# Patient Record
Sex: MALE | Race: WHITE | NOT HISPANIC OR LATINO
[De-identification: names, ages, dates, MRNs, and addresses within clinical notes are randomized per-mention and may not be internally consistent; named-entity substitution may affect disease eponyms.]

---

## 2017-09-07 ENCOUNTER — APPOINTMENT (OUTPATIENT)
Dept: PULMONOLOGY | Facility: CLINIC | Age: 72
End: 2017-09-07
Payer: MEDICARE

## 2017-09-07 VITALS
TEMPERATURE: 98.2 F | DIASTOLIC BLOOD PRESSURE: 76 MMHG | WEIGHT: 160 LBS | RESPIRATION RATE: 16 BRPM | BODY MASS INDEX: 24.25 KG/M2 | HEIGHT: 68 IN | HEART RATE: 85 BPM | SYSTOLIC BLOOD PRESSURE: 156 MMHG | OXYGEN SATURATION: 96 %

## 2017-09-07 PROBLEM — Z00.00 ENCOUNTER FOR PREVENTIVE HEALTH EXAMINATION: Status: ACTIVE | Noted: 2017-09-07

## 2017-09-07 PROCEDURE — 94060 EVALUATION OF WHEEZING: CPT

## 2017-09-07 PROCEDURE — 99203 OFFICE O/P NEW LOW 30 MIN: CPT | Mod: 25

## 2017-09-07 PROCEDURE — 94729 DIFFUSING CAPACITY: CPT

## 2017-09-07 PROCEDURE — 94727 GAS DIL/WSHOT DETER LNG VOL: CPT

## 2018-02-04 ENCOUNTER — INPATIENT (INPATIENT)
Facility: HOSPITAL | Age: 73
LOS: 1 days | Discharge: HOME | DRG: 192 | End: 2018-02-06
Attending: INTERNAL MEDICINE | Admitting: INTERNAL MEDICINE
Payer: MEDICARE

## 2018-02-04 VITALS
DIASTOLIC BLOOD PRESSURE: 79 MMHG | HEART RATE: 86 BPM | HEIGHT: 71 IN | WEIGHT: 162.92 LBS | OXYGEN SATURATION: 88 % | TEMPERATURE: 99 F | SYSTOLIC BLOOD PRESSURE: 153 MMHG | RESPIRATION RATE: 20 BRPM

## 2018-02-04 DIAGNOSIS — Z90.89 ACQUIRED ABSENCE OF OTHER ORGANS: Chronic | ICD-10-CM

## 2018-02-04 DIAGNOSIS — Z29.9 ENCOUNTER FOR PROPHYLACTIC MEASURES, UNSPECIFIED: ICD-10-CM

## 2018-02-04 DIAGNOSIS — J44.1 CHRONIC OBSTRUCTIVE PULMONARY DISEASE WITH (ACUTE) EXACERBATION: ICD-10-CM

## 2018-02-04 DIAGNOSIS — R09.02 HYPOXEMIA: ICD-10-CM

## 2018-02-04 LAB
ALBUMIN SERPL ELPH-MCNC: 4.1 G/DL — SIGNIFICANT CHANGE UP (ref 3.5–5)
ALP SERPL-CCNC: 115 U/L — SIGNIFICANT CHANGE UP (ref 40–120)
ALT FLD-CCNC: 22 U/L DA — SIGNIFICANT CHANGE UP (ref 10–60)
ANION GAP SERPL CALC-SCNC: 6 MMOL/L — SIGNIFICANT CHANGE UP (ref 5–17)
APTT BLD: 30.1 SEC — SIGNIFICANT CHANGE UP (ref 27.5–37.4)
AST SERPL-CCNC: 21 U/L — SIGNIFICANT CHANGE UP (ref 10–40)
BASE EXCESS BLDA CALC-SCNC: 2.7 MMOL/L — HIGH (ref -2–2)
BASOPHILS # BLD AUTO: 0.2 K/UL — SIGNIFICANT CHANGE UP (ref 0–0.2)
BASOPHILS NFR BLD AUTO: 1.5 % — SIGNIFICANT CHANGE UP (ref 0–2)
BILIRUB SERPL-MCNC: 0.6 MG/DL — SIGNIFICANT CHANGE UP (ref 0.2–1.2)
BLOOD GAS COMMENTS ARTERIAL: SIGNIFICANT CHANGE UP
BUN SERPL-MCNC: 12 MG/DL — SIGNIFICANT CHANGE UP (ref 7–18)
CALCIUM SERPL-MCNC: 8.8 MG/DL — SIGNIFICANT CHANGE UP (ref 8.4–10.5)
CHLORIDE SERPL-SCNC: 101 MMOL/L — SIGNIFICANT CHANGE UP (ref 96–108)
CK MB BLD-MCNC: 3.5 % — SIGNIFICANT CHANGE UP (ref 0–3.5)
CK MB CFR SERPL CALC: 6.3 NG/ML — HIGH (ref 0–3.6)
CK SERPL-CCNC: 178 U/L — SIGNIFICANT CHANGE UP (ref 35–232)
CO2 SERPL-SCNC: 30 MMOL/L — SIGNIFICANT CHANGE UP (ref 22–31)
CREAT SERPL-MCNC: 0.75 MG/DL — SIGNIFICANT CHANGE UP (ref 0.5–1.3)
EOSINOPHIL # BLD AUTO: 0.6 K/UL — HIGH (ref 0–0.5)
EOSINOPHIL NFR BLD AUTO: 3.9 % — SIGNIFICANT CHANGE UP (ref 0–6)
GLUCOSE SERPL-MCNC: 96 MG/DL — SIGNIFICANT CHANGE UP (ref 70–99)
HCO3 BLDA-SCNC: 28 MMOL/L — HIGH (ref 23–27)
HCT VFR BLD CALC: 48.9 % — SIGNIFICANT CHANGE UP (ref 39–50)
HGB BLD-MCNC: 15.5 G/DL — SIGNIFICANT CHANGE UP (ref 13–17)
HOROWITZ INDEX BLDA+IHG-RTO: 21 — SIGNIFICANT CHANGE UP
INR BLD: 1.02 RATIO — SIGNIFICANT CHANGE UP (ref 0.88–1.16)
LACTATE SERPL-SCNC: 1.3 MMOL/L — SIGNIFICANT CHANGE UP (ref 0.7–2)
LIDOCAIN IGE QN: 105 U/L — SIGNIFICANT CHANGE UP (ref 73–393)
LYMPHOCYTES # BLD AUTO: 1.3 K/UL — SIGNIFICANT CHANGE UP (ref 1–3.3)
LYMPHOCYTES # BLD AUTO: 8.8 % — LOW (ref 13–44)
MCHC RBC-ENTMCNC: 31.6 GM/DL — LOW (ref 32–36)
MCHC RBC-ENTMCNC: 32.3 PG — SIGNIFICANT CHANGE UP (ref 27–34)
MCV RBC AUTO: 102.2 FL — HIGH (ref 80–100)
MONOCYTES # BLD AUTO: 1.3 K/UL — HIGH (ref 0–0.9)
MONOCYTES NFR BLD AUTO: 9.1 % — SIGNIFICANT CHANGE UP (ref 2–14)
NEUTROPHILS # BLD AUTO: 11.1 K/UL — HIGH (ref 1.8–7.4)
NEUTROPHILS NFR BLD AUTO: 76.6 % — SIGNIFICANT CHANGE UP (ref 43–77)
NT-PROBNP SERPL-SCNC: 305 PG/ML — HIGH (ref 0–125)
PCO2 BLDA: 49 MMHG — HIGH (ref 32–46)
PH BLDA: 7.38 — SIGNIFICANT CHANGE UP (ref 7.35–7.45)
PLATELET # BLD AUTO: 299 K/UL — SIGNIFICANT CHANGE UP (ref 150–400)
PO2 BLDA: 59 MMHG — LOW (ref 74–108)
POTASSIUM SERPL-MCNC: 4.2 MMOL/L — SIGNIFICANT CHANGE UP (ref 3.5–5.3)
POTASSIUM SERPL-SCNC: 4.2 MMOL/L — SIGNIFICANT CHANGE UP (ref 3.5–5.3)
PROT SERPL-MCNC: 8.3 G/DL — SIGNIFICANT CHANGE UP (ref 6–8.3)
PROTHROM AB SERPL-ACNC: 11.1 SEC — SIGNIFICANT CHANGE UP (ref 9.8–12.7)
RBC # BLD: 4.79 M/UL — SIGNIFICANT CHANGE UP (ref 4.2–5.8)
RBC # FLD: 13 % — SIGNIFICANT CHANGE UP (ref 10.3–14.5)
SAO2 % BLDA: 92 % — SIGNIFICANT CHANGE UP (ref 92–96)
SODIUM SERPL-SCNC: 137 MMOL/L — SIGNIFICANT CHANGE UP (ref 135–145)
TROPONIN I SERPL-MCNC: <0.015 NG/ML — SIGNIFICANT CHANGE UP (ref 0–0.04)
WBC # BLD: 14.5 K/UL — HIGH (ref 3.8–10.5)
WBC # FLD AUTO: 14.5 K/UL — HIGH (ref 3.8–10.5)

## 2018-02-04 PROCEDURE — 99291 CRITICAL CARE FIRST HOUR: CPT

## 2018-02-04 RX ORDER — PANTOPRAZOLE SODIUM 20 MG/1
40 TABLET, DELAYED RELEASE ORAL
Qty: 0 | Refills: 0 | Status: DISCONTINUED | OUTPATIENT
Start: 2018-02-04 | End: 2018-02-06

## 2018-02-04 RX ORDER — AZITHROMYCIN 500 MG/1
500 TABLET, FILM COATED ORAL ONCE
Qty: 0 | Refills: 0 | Status: COMPLETED | OUTPATIENT
Start: 2018-02-04 | End: 2018-02-04

## 2018-02-04 RX ORDER — CEFTRIAXONE 500 MG/1
1 INJECTION, POWDER, FOR SOLUTION INTRAMUSCULAR; INTRAVENOUS ONCE
Qty: 0 | Refills: 0 | Status: COMPLETED | OUTPATIENT
Start: 2018-02-04 | End: 2018-02-04

## 2018-02-04 RX ORDER — IPRATROPIUM/ALBUTEROL SULFATE 18-103MCG
3 AEROSOL WITH ADAPTER (GRAM) INHALATION
Qty: 0 | Refills: 0 | Status: COMPLETED | OUTPATIENT
Start: 2018-02-04 | End: 2018-02-04

## 2018-02-04 RX ORDER — SODIUM CHLORIDE 9 MG/ML
3 INJECTION INTRAMUSCULAR; INTRAVENOUS; SUBCUTANEOUS ONCE
Qty: 0 | Refills: 0 | Status: COMPLETED | OUTPATIENT
Start: 2018-02-04 | End: 2018-02-04

## 2018-02-04 RX ORDER — ENOXAPARIN SODIUM 100 MG/ML
40 INJECTION SUBCUTANEOUS DAILY
Qty: 0 | Refills: 0 | Status: DISCONTINUED | OUTPATIENT
Start: 2018-02-04 | End: 2018-02-06

## 2018-02-04 RX ORDER — IPRATROPIUM/ALBUTEROL SULFATE 18-103MCG
3 AEROSOL WITH ADAPTER (GRAM) INHALATION EVERY 6 HOURS
Qty: 0 | Refills: 0 | Status: COMPLETED | OUTPATIENT
Start: 2018-02-04 | End: 2018-02-05

## 2018-02-04 RX ORDER — ASPIRIN/CALCIUM CARB/MAGNESIUM 324 MG
81 TABLET ORAL ONCE
Qty: 0 | Refills: 0 | Status: COMPLETED | OUTPATIENT
Start: 2018-02-04 | End: 2018-02-04

## 2018-02-04 RX ORDER — ALBUTEROL 90 UG/1
2 AEROSOL, METERED ORAL EVERY 4 HOURS
Qty: 0 | Refills: 0 | Status: DISCONTINUED | OUTPATIENT
Start: 2018-02-04 | End: 2018-02-04

## 2018-02-04 RX ORDER — IPRATROPIUM/ALBUTEROL SULFATE 18-103MCG
3 AEROSOL WITH ADAPTER (GRAM) INHALATION EVERY 6 HOURS
Qty: 0 | Refills: 0 | Status: DISCONTINUED | OUTPATIENT
Start: 2018-02-04 | End: 2018-02-04

## 2018-02-04 RX ADMIN — Medication 3 MILLILITER(S): at 17:06

## 2018-02-04 RX ADMIN — Medication 80 MILLIGRAM(S): at 17:06

## 2018-02-04 RX ADMIN — Medication 3 MILLILITER(S): at 17:25

## 2018-02-04 RX ADMIN — SODIUM CHLORIDE 3 MILLILITER(S): 9 INJECTION INTRAMUSCULAR; INTRAVENOUS; SUBCUTANEOUS at 17:07

## 2018-02-04 RX ADMIN — AZITHROMYCIN 250 MILLIGRAM(S): 500 TABLET, FILM COATED ORAL at 17:06

## 2018-02-04 RX ADMIN — Medication 40 MILLIGRAM(S): at 23:22

## 2018-02-04 RX ADMIN — CEFTRIAXONE 100 GRAM(S): 500 INJECTION, POWDER, FOR SOLUTION INTRAMUSCULAR; INTRAVENOUS at 23:03

## 2018-02-04 RX ADMIN — Medication 81 MILLIGRAM(S): at 17:06

## 2018-02-04 RX ADMIN — Medication 3 MILLILITER(S): at 18:55

## 2018-02-04 NOTE — H&P ADULT - ASSESSMENT
71 yo male from home with PMH COPD (recently diagnosed 09/2017, not on any meds/ home O2) , BIBA to ED c/o shortness of breath. pt had dry cough since Friday, getting worse today, pt went to Urgent care showed O2 86% on room air, given one duoneb treatment, not improving then was sent to ED. Pt had b/l mild wheezing, left > right.     In ER, pt temp 98.8. HR 86, /79, RR 20 , SO2 88% on room air, leukocytosis WBC 14.5, ABG 7.38/49/59/28, CXR shows scaring linear atelectasis at the left costophrenic angle, s/p zithromax x1 and rocephin x1, s/p duoneb x1, IV steroids 80mg x1 , pt on NC 3L now, SO2 94%.     Pt will be admitted to medicine for COPD exacerbation.

## 2018-02-04 NOTE — ED PROVIDER NOTE - OBJECTIVE STATEMENT
72 y.o. male with recently diagnosed COPD, BIBA pt with coughing since Fri., dry coughing, wheezing, no nasal congestion, no vomiting, no flu vaccine, pt went to Urgent Care O2 sat 88%, given 1 treatment & sent to ED

## 2018-02-04 NOTE — H&P ADULT - ATTENDING COMMENTS
Patient was discussed with Dr. Navarro last evening and examined in the ED earlier today.   He is a 71 yo man who was sent by urgent care because of wheezing and hypoxia.   He has no history of treatment for COPD, but has been a heavy smoker, cutting down recently, and quitting 48 hours ago.  He is retired, but active. He takes several drinks per day.     Alert, active, anxious 71 yo man in moderate respiratory distress  Vital Signs Last 24 Hrs  T(C): 36.3 (05 Feb 2018 19:43), Max: 36.9 (05 Feb 2018 05:31)  T(F): 97.4 (05 Feb 2018 19:43), Max: 98.5 (05 Feb 2018 05:31)  HR: 87 (05 Feb 2018 19:43) (77 - 87)  BP: 151/60 (05 Feb 2018 19:43) (119/49 - 151/60)  BP(mean): --  RR: 17 (05 Feb 2018 19:43) (17 - 20)  SpO2: 94% (05 Feb 2018 19:43) (94% - 100%)  Increased A-P diameter  Lungs, decreased breath sounds  cor, distant heart sounds  Abdomen, soft  Neurological, intact except external deviation of the left eye (known history)    Blood Gas Profile - Arterial (02.04.18 @ 16:33)    pH, Arterial: 7.38    pCO2, Arterial: 49 mmHg    pO2, Arterial: 59 mmHg    HCO3, Arterial: 28 mmol/L    Base Excess, Arterial: 2.7 mmol/L    Oxygen Saturation, Arterial: 92 %    FIO2, Arterial: 21.0    Blood Gas Comments Arterial: room air left brachial    A-a gradient  328                            15.5   14.5  )-----------( 299      ( 04 Feb 2018 15:50 )             48.9       02-05    137  |  101  |  20<H>  ----------------------------<  125<H>  3.9   |  29  |  0.97    Ca    9.3      05 Feb 2018 17:54  Phos  1.6     02-05  Mg     2.4     02-05    TPro  7.9  /  Alb  3.8  /  TBili  0.3  /  DBili  0.1  /  AST  28  /  ALT  23  /  AlkPhos  94  02-05          ABG - ( 04 Feb 2018 16:33 )  pH: 7.38  /  pCO2: 49    /  pO2: 59    / HCO3: 28    / Base Excess: 2.7   /  SaO2: 92            PT/INR - ( 04 Feb 2018 15:50 )   PT: 11.1 sec;   INR: 1.02 ratio         PTT - ( 04 Feb 2018 15:50 )  PTT:30.1 sec    Lactate Trend  02-04 @ 15:50 Lactate:1.3       CARDIAC MARKERS ( 05 Feb 2018 00:25 )  <0.015 ng/mL / x     / x     / x     / x      CARDIAC MARKERS ( 04 Feb 2018 15:50 )  <0.015 ng/mL / x     / 178 U/L / x     / 6.3 ng/mL    < from: Xray Chest 1 View AP/PA (02.04.18 @ 17:38) >      INTERPRETATION:  CLINICAL INFORMATION: Chest pain.    A single portable view of the chest was obtained.     Comparison: None.     The mediastinal cardiac silhouette is unremarkable.    Scarring and/or linear atelectasis at the left costophrenic angle. The   lungs are otherwise clear.    No acute osseous finding.     < end of copied text >    IMP:  COPD exacerbation in a patient who is not previously diagnosed with COPD.  Patient has an indication for high resolution CT screening for lung cancer.           Anxiety about being in the hospital, R/o EtOH withdrawal          Heavy alcohol use, not drinking with acute illness.   Plan: Thiamine 500mg IVPB tid x 3 days.           Steroids, nebulizer          Empirical antibiotics, as have been started by ED.           Pulmonary consultation.          repeat O2 sat on RA.          Switch to oral prednisone in AM.

## 2018-02-04 NOTE — ED ADULT NURSE NOTE - OBJECTIVE STATEMENT
pt complained of nonproductive cough x 3days, SOB, newly diagnose with COPD, pt was sent from PMD due to decrease o2 saturation, denies nausea no vomiting, pt is independent , skin intact, pt vital signs stable at this time ,pt is in bed, pt safety will be maintained, family at bedside

## 2018-02-04 NOTE — H&P ADULT - PROBLEM SELECTOR PLAN 2
IMPROVE VTE Individual Risk Assessment    RISK                                                          Points  [] Previous VTE                                           3  [] Thrombophilia                                        2  [] Lower limb paralysis                              2   [] Current Cancer                                       2   [x] Immobilization > 24 hrs                        1  [] ICU/CCU stay > 24 hours                       1  [x] Age > 60                                                   1    IMPROVE VTE Score: 2    need for DVT ppx , on lovenox 40mg, daily  no need for GI ppx IMPROVE VTE Individual Risk Assessment    RISK                                                          Points  [] Previous VTE                                           3  [] Thrombophilia                                        2  [] Lower limb paralysis                              2   [] Current Cancer                                       2   [x] Immobilization > 24 hrs                        1  [] ICU/CCU stay > 24 hours                       1  [x] Age > 60                                                   1    IMPROVE VTE Score: 2    need for DVT ppx , on lovenox 40mg, daily  need for GI ppx, on Protonix

## 2018-02-04 NOTE — ED PROVIDER NOTE - CARE PLAN
Principal Discharge DX:	Hypoxemia Principal Discharge DX:	Hypoxemia  Secondary Diagnosis:	COPD with acute exacerbation

## 2018-02-04 NOTE — H&P ADULT - NSHPLABSRESULTS_GEN_ALL_CORE
Blood Gas Profile - Arterial (02.04.18 @ 16:33)    pH, Arterial: 7.38    pCO2, Arterial: 49 mmHg    pO2, Arterial: 59 mmHg    HCO3, Arterial: 28 mmol/L    Base Excess, Arterial: 2.7 mmol/L    Oxygen Saturation, Arterial: 92 %    FIO2, Arterial: 21.0    Blood Gas Comments Arterial: room air left brachial      Complete Blood Count + Automated Diff (02.04.18 @ 15:50)    WBC Count: 14.5 K/uL    RBC Count: 4.79 M/uL    Hemoglobin: 15.5 g/dL    Hematocrit: 48.9 %    Mean Cell Volume: 102.2 fl    Mean Cell Hemoglobin: 32.3 pg    Mean Cell Hemoglobin Conc: 31.6 gm/dL    Red Cell Distrib Width: 13.0 %    Platelet Count - Automated: 299 K/uL    Auto Neutrophil #: 11.1 K/uL    Auto Lymphocyte #: 1.3 K/uL    Auto Monocyte #: 1.3 K/uL    Auto Eosinophil #: 0.6 K/uL    Auto Basophil #: 0.2 K/uL    Auto Neutrophil %: 76.6: Differential percentages must be correlated with absolute numbers for  clinical significance. %    Auto Lymphocyte %: 8.8 %    Auto Monocyte %: 9.1 %    Auto Eosinophil %: 3.9 %    Auto Basophil %: 1.5 %      Comprehensive Metabolic Panel (02.04.18 @ 15:50)    Sodium, Serum: 137 mmol/L    Potassium, Serum: 4.2 mmol/L    Chloride, Serum: 101 mmol/L    Carbon Dioxide, Serum: 30 mmol/L    Anion Gap, Serum: 6 mmol/L    Blood Urea Nitrogen, Serum: 12 mg/dL    Creatinine, Serum: 0.75 mg/dL    Glucose, Serum: 96 mg/dL    Calcium, Total Serum: 8.8 mg/dL    Protein Total, Serum: 8.3 g/dL    Albumin, Serum: 4.1 g/dL    Bilirubin Total, Serum: 0.6 mg/dL    Alkaline Phosphatase, Serum: 115 U/L    Aspartate Aminotransferase (AST/SGOT): 21 U/L    Alanine Aminotransferase (ALT/SGPT): 22 U/L DA    eGFR if Non : 92:   < from: Xray Chest 1 View AP/PA (02.04.18 @ 17:38) >    INTERPRETATION:  CLINICAL INFORMATION: Chest pain.    A single portable view of the chest was obtained.     Comparison: None.     The mediastinal cardiac silhouette is unremarkable.    Scarring and/or linear atelectasis at the left costophrenic angle. The   lungs are otherwise clear.    No acute osseous finding.     IMPRESSION:    As above.    < end of copied text >

## 2018-02-04 NOTE — ED PROVIDER NOTE - CRITICAL CARE PROVIDED
additional history taking/consultation with other physicians/direct patient care (not related to procedure)/interpretation of diagnostic studies/consult w/ pt's family directly relating to pts condition/documentation

## 2018-02-04 NOTE — H&P ADULT - HISTORY OF PRESENT ILLNESS
71 yo male from home with PMH COPD (recently diagnosed 09/2017, not on any meds/ home O2) , BIBA to ED c/o shortness of breath. pt had dry cough since Friday, getting worse today, pt went to Urgent care showed O2 86% on room air, given one duoneb treatment, not improving then was sent to ED. Pt had b/l mild wheezing, left > right. Pt denied chest pain, no nasal congestion, no nausea, vomiting diarrhea, constipation, no illness contact or any other complains. Pt had no flu vaccine this year.     In ER, pt temp 98.8. HR 86, /79, RR 20 , SO2 88% on room air, leukocytosis WBC 14.5, ABG 7.38/49/59/28, CXR shows scaring linear atelectasis at the left costophrenic angle, s/p zithromax x1 and rocephin x1, s/p duoneb x1, IV steroids 80mg x1 , pt on NC 3L now, SO2 94%. 73 yo male from home with PMH COPD (recently diagnosed 09/2017, not on any meds/ home O2) , BIBA to ED c/o shortness of breath. pt had dry cough since Friday, getting worse today, pt went to Urgent care showed O2 86% on room air, given one duoneb treatment, not improving then was sent to ED. Pt had b/l mild wheezing, left > right. Pt denied chest pain, no nasal congestion, no nausea, vomiting diarrhea, constipation, no illness contact or any other complains. Pt had no flu vaccine this year.     In ER, pt temp 98.8. HR 86, /79, RR 20 , SO2 88% on room air, EKG NSR, leukocytosis WBC 14.5, ABG 7.38/49/59/28, CXR shows scaring linear atelectasis at the left costophrenic angle, s/p zithromax x1 and rocephin x1, s/p duoneb x1, IV steroids 80mg x1 , pt on NC 3L now, SO2 94%.

## 2018-02-04 NOTE — H&P ADULT - PROBLEM SELECTOR PLAN 1
pt temp 98.8. HR 86, /79, RR 20 , SO2 88% on room air, b/l wheezing, R>L  most likely 2/2 infection virus vs bacteria   aferbir leukocytosis WBC 14.5,  ABG 7.38/49/59/28 on admission  CXR shows scaring linear atelectasis at the left costophrenic angle   s/p zithromax x1 and rocephin x1, s/p duoneb x1, IV steroids 80mg x1 in ER  pt on NC 3L for now  f/u RVP, Blood culture, UA  c/w IV steroids 40mg bid, will taper to PO steroids pt temp 98.8. HR 86, /79, RR 20 , SO2 88% on room air, b/l wheezing, R>L  most likely 2/2 infection virus vs bacteria   aferbir leukocytosis WBC 14.5,  ABG 7.38/49/59/28 on admission  CXR shows scaring linear atelectasis at the left costophrenic angle   s/p zithromax x1 and rocephin x1, s/p duoneb x1, IV steroids 80mg x1 in ER  pt on NC 3L for now  c/w IV steroids 40mg bid, will taper to PO steroids  c/w duonebs, albuterol inhaler PRN   f/u RVP, Blood culture, UA pt temp 98.8. HR 86, /79, RR 20 , SO2 88% on room air, b/l wheezing, R>L  most likely 2/2 infection virus vs bacteria   aferbir leukocytosis WBC 14.5, EKG NSR  ABG 7.38/49/59/28 on admission  CXR shows scaring linear atelectasis at the left costophrenic angle   s/p zithromax x1 and rocephin x1, s/p duoneb x1, IV steroids 80mg x1 in ER  pt on NC 3L for now  c/w IV steroids 40mg bid, will taper to PO steroids  c/w duonebs, albuterol inhaler PRN   f/u RVP, Blood culture, UA

## 2018-02-05 ENCOUNTER — TRANSCRIPTION ENCOUNTER (OUTPATIENT)
Age: 73
End: 2018-02-05

## 2018-02-05 LAB
ALBUMIN SERPL ELPH-MCNC: 3.8 G/DL — SIGNIFICANT CHANGE UP (ref 3.5–5)
ALP SERPL-CCNC: 94 U/L — SIGNIFICANT CHANGE UP (ref 40–120)
ALT FLD-CCNC: 23 U/L DA — SIGNIFICANT CHANGE UP (ref 10–60)
ANION GAP SERPL CALC-SCNC: 7 MMOL/L — SIGNIFICANT CHANGE UP (ref 5–17)
AST SERPL-CCNC: 28 U/L — SIGNIFICANT CHANGE UP (ref 10–40)
BILIRUB DIRECT SERPL-MCNC: 0.1 MG/DL — SIGNIFICANT CHANGE UP (ref 0–0.2)
BILIRUB INDIRECT FLD-MCNC: 0.2 MG/DL — SIGNIFICANT CHANGE UP (ref 0.2–1)
BILIRUB SERPL-MCNC: 0.3 MG/DL — SIGNIFICANT CHANGE UP (ref 0.2–1.2)
BUN SERPL-MCNC: 20 MG/DL — HIGH (ref 7–18)
CALCIUM SERPL-MCNC: 9.3 MG/DL — SIGNIFICANT CHANGE UP (ref 8.4–10.5)
CHLORIDE SERPL-SCNC: 101 MMOL/L — SIGNIFICANT CHANGE UP (ref 96–108)
CO2 SERPL-SCNC: 29 MMOL/L — SIGNIFICANT CHANGE UP (ref 22–31)
CREAT SERPL-MCNC: 0.97 MG/DL — SIGNIFICANT CHANGE UP (ref 0.5–1.3)
GLUCOSE SERPL-MCNC: 125 MG/DL — HIGH (ref 70–99)
MAGNESIUM SERPL-MCNC: 2.4 MG/DL — SIGNIFICANT CHANGE UP (ref 1.6–2.6)
PHOSPHATE SERPL-MCNC: 1.6 MG/DL — LOW (ref 2.5–4.5)
POTASSIUM SERPL-MCNC: 3.9 MMOL/L — SIGNIFICANT CHANGE UP (ref 3.5–5.3)
POTASSIUM SERPL-SCNC: 3.9 MMOL/L — SIGNIFICANT CHANGE UP (ref 3.5–5.3)
PROT SERPL-MCNC: 7.9 G/DL — SIGNIFICANT CHANGE UP (ref 6–8.3)
RAPID RVP RESULT: SIGNIFICANT CHANGE UP
SODIUM SERPL-SCNC: 137 MMOL/L — SIGNIFICANT CHANGE UP (ref 135–145)
TROPONIN I SERPL-MCNC: <0.015 NG/ML — SIGNIFICANT CHANGE UP (ref 0–0.04)

## 2018-02-05 PROCEDURE — 99223 1ST HOSP IP/OBS HIGH 75: CPT | Mod: AI,GC

## 2018-02-05 RX ORDER — THIAMINE MONONITRATE (VIT B1) 100 MG
500 TABLET ORAL THREE TIMES A DAY
Qty: 0 | Refills: 0 | Status: DISCONTINUED | OUTPATIENT
Start: 2018-02-05 | End: 2018-02-06

## 2018-02-05 RX ORDER — THIAMINE MONONITRATE (VIT B1) 100 MG
100 TABLET ORAL DAILY
Qty: 0 | Refills: 0 | Status: CANCELLED | OUTPATIENT
Start: 2018-02-08 | End: 2018-02-06

## 2018-02-05 RX ORDER — PYRIDOXINE HCL (VITAMIN B6) 100 MG
100 TABLET ORAL DAILY
Qty: 0 | Refills: 0 | Status: DISCONTINUED | OUTPATIENT
Start: 2018-02-05 | End: 2018-02-06

## 2018-02-05 RX ORDER — BUDESONIDE AND FORMOTEROL FUMARATE DIHYDRATE 160; 4.5 UG/1; UG/1
2 AEROSOL RESPIRATORY (INHALATION)
Qty: 0 | Refills: 0 | Status: DISCONTINUED | OUTPATIENT
Start: 2018-02-05 | End: 2018-02-06

## 2018-02-05 RX ORDER — THIAMINE MONONITRATE (VIT B1) 100 MG
100 TABLET ORAL DAILY
Qty: 0 | Refills: 0 | Status: DISCONTINUED | OUTPATIENT
Start: 2018-02-05 | End: 2018-02-05

## 2018-02-05 RX ADMIN — Medication 3 MILLILITER(S): at 14:21

## 2018-02-05 RX ADMIN — Medication 40 MILLIGRAM(S): at 21:53

## 2018-02-05 RX ADMIN — BUDESONIDE AND FORMOTEROL FUMARATE DIHYDRATE 2 PUFF(S): 160; 4.5 AEROSOL RESPIRATORY (INHALATION) at 21:52

## 2018-02-05 RX ADMIN — Medication 3 MILLILITER(S): at 20:19

## 2018-02-05 RX ADMIN — Medication 3 MILLILITER(S): at 08:46

## 2018-02-05 RX ADMIN — Medication 40 MILLIGRAM(S): at 14:19

## 2018-02-05 RX ADMIN — Medication 255 MILLIGRAM(S): at 21:52

## 2018-02-05 RX ADMIN — ENOXAPARIN SODIUM 40 MILLIGRAM(S): 100 INJECTION SUBCUTANEOUS at 11:28

## 2018-02-05 RX ADMIN — PANTOPRAZOLE SODIUM 40 MILLIGRAM(S): 20 TABLET, DELAYED RELEASE ORAL at 08:46

## 2018-02-05 RX ADMIN — Medication 40 MILLIGRAM(S): at 05:56

## 2018-02-05 RX ADMIN — Medication 3 MILLILITER(S): at 04:12

## 2018-02-05 RX ADMIN — Medication 255 MILLIGRAM(S): at 18:39

## 2018-02-05 NOTE — DISCHARGE NOTE ADULT - ADDITIONAL INSTRUCTIONS
Pmd in 1 week Followup w/ PCP in 1 week Followup w/ PCP in 1 week  Follow up w/ Pulmonologist-Dr. Molina for pulmonary function test

## 2018-02-05 NOTE — DISCHARGE NOTE ADULT - CARE PLAN
Principal Discharge DX:	COPD with acute exacerbation  Secondary Diagnosis:	Hypoxemia Principal Discharge DX:	COPD with acute exacerbation  Goal:	Resolving  Assessment and plan of treatment:	Continue and complete antibiotic as instructed.    Use your new medication daily as instructed  Follow up w/ PCP in 1 week  Secondary Diagnosis:	Hypoxemia  Goal:	Resolved  Assessment and plan of treatment:	This was due to your COPD exacerbation.  Please adhere to your daily medication regimen.

## 2018-02-05 NOTE — DISCHARGE NOTE ADULT - MEDICATION SUMMARY - MEDICATIONS TO TAKE
I will START or STAY ON the medications listed below when I get home from the hospital:    predniSONE 10 mg oral tablet  -- 1 tab(s) by mouth once a day Taper:  40mg (4tab) x 5 days  30mg (3tab) x 2d  20mg (2tab) x 2d  10mg (1tab) x 2d  -- It is very important that you take or use this exactly as directed.  Do not skip doses or discontinue unless directed by your doctor.  Obtain medical advice before taking any non-prescription drugs as some may affect the action of this medication.  Take with food or milk.    -- Indication: For COPD exacerbation    budesonide-formoterol 160 mcg-4.5 mcg/inh inhalation aerosol  -- 2 puff(s) inhaled 2 times a day   -- Indication: For COPD exacerbation    thiamine 100 mg oral tablet  -- 1 tab(s) by mouth once a day  -- Indication: For Health Maintenance I will START or STAY ON the medications listed below when I get home from the hospital:    predniSONE 10 mg oral tablet  -- 1 tab(s) by mouth once a day Taper:  40mg (4tab) x 5 days  30mg (3tab) x 2d  20mg (2tab) x 2d  10mg (1tab) x 2d  -- It is very important that you take or use this exactly as directed.  Do not skip doses or discontinue unless directed by your doctor.  Obtain medical advice before taking any non-prescription drugs as some may affect the action of this medication.  Take with food or milk.    -- Indication: For COPD exacerbation    budesonide-formoterol 160 mcg-4.5 mcg/inh inhalation aerosol  -- 2 puff(s) inhaled 2 times a day   -- Indication: For COPD exacerbation    Proventil HFA 90 mcg/inh inhalation aerosol  -- 2 puff(s) inhaled every 4 hours   -- For inhalation only.  It is very important that you take or use this exactly as directed.  Do not skip doses or discontinue unless directed by your doctor.  Obtain medical advice before taking any non-prescription drugs as some may affect the action of this medication.  Shake well before use.    -- Indication: For COPD exacerbation    Ceftin 250 mg oral tablet  -- 1 tab(s) by mouth 2 times a day   -- Finish all this medication unless otherwise directed by prescriber.  Medication should be taken with plenty of water.  Take with food or milk.    -- Indication: For COPD exacerbation    thiamine 100 mg oral tablet  -- 1 tab(s) by mouth once a day  -- Indication: For Health Maintenance

## 2018-02-05 NOTE — DISCHARGE NOTE ADULT - HOSPITAL COURSE
73 yo male from home with PMH COPD (recently diagnosed 09/2017, not on any meds/ home O2) , BIBA to ED c/o shortness of breath. pt had dry cough since Friday, getting worse today, pt went to Urgent care showed O2 86% on room air, given one duoneb treatment, not improving then was sent to ED. Pt had b/l mild wheezing, left > right. Pt denied chest pain, no nasal congestion, no nausea, vomiting diarrhea, constipation, no illness contact or any other complains. Pt had no flu vaccine this year.     In ER, pt temp 98.8. HR 86, /79, RR 20 , SO2 88% on room air, EKG NSR, leukocytosis WBC 14.5, ABG 7.38/49/59/28, CXR shows scaring linear atelectasis at the left costophrenic angle, s/p zithromax x1 and rocephin x1, s/p duoneb x1, IV steroids 80mg x1 , pt on NC 3L now, SO2 94%.   Symptoms improved, ambulatory O2sat----------------->  Cleared for discharge home today. 71yo male with PMH COPD (recently diagnosed 09/2017, not on any meds/ home O2) , BIBA to ED c/o shortness of breath.  Reported dry cough since Friday, and worsening.   Reported recent visit to Urgent care was given one duoneb treatment and did not improve.   Instructed to go to ED. Pt had b/l mild wheezing, left > right. Pt denied chest pain, no nasal congestion, no nausea, vomiting diarrhea, constipation, no illness contact or any other complains. Pt had no flu vaccine this year.     Admitted to medicine for COPD Exacerbation    COPD Exacerbation  Afebrile and mild leukocytosis  Respiratory viral panel negative  EKG normal  Chest xray showed scaring linear atelectasis at the left costophrenic angle  Received Azithromycin, Rocephin and steroids  Received NC 3L.  Ambulating oxygen saturation=94% and warranted no home oxygen.    Need for Prophylactic Measure  Lovenox for DVT prophylaxis

## 2018-02-05 NOTE — ED ADULT NURSE REASSESSMENT NOTE - NS ED NURSE REASSESS COMMENT FT1
Patient remains hemodynamically stable and in no acute distress, able to speak in full sentences and breathe comfortably in room air. Oxygen saturation is now 94% 2L NC. Family member and MD sOMA BY BEDSIDE. Patient was endorsed to CLARISA Shrestha in holding.

## 2018-02-05 NOTE — DISCHARGE NOTE ADULT - PATIENT PORTAL LINK FT
You can access [facility], offered by [facility], by registering with the following website: [email or web address]

## 2018-02-05 NOTE — DISCHARGE NOTE ADULT - CARE PROVIDER_API CALL
Anthony Abad  Phone: [phone]  Fax: (       - Anthony Abad  Phone: [phone]  Fax: (   )    -    Kevin Molina (DIANNE), Internal Medicine; Pulmonary Disease  [address] [place] [address]  Phone: [phone]  Fax: [phone]

## 2018-02-05 NOTE — DISCHARGE NOTE ADULT - PROVIDER TOKENS
FREE:[LAST:[Pollo],FIRST:[Anthony],PHONE:[phone]],FAX:[(   )    -]] FREE:[LAST:[Pollo],FIRST:[Anthony],PHONE:[phone]:[(   )    -]],TOKEN:'[id number]'

## 2018-02-06 VITALS
TEMPERATURE: 98 F | DIASTOLIC BLOOD PRESSURE: 58 MMHG | SYSTOLIC BLOOD PRESSURE: 133 MMHG | HEART RATE: 82 BPM | OXYGEN SATURATION: 94 % | RESPIRATION RATE: 18 BRPM

## 2018-02-06 DIAGNOSIS — F17.201 NICOTINE DEPENDENCE, UNSPECIFIED, IN REMISSION: ICD-10-CM

## 2018-02-06 DIAGNOSIS — F41.9 ANXIETY DISORDER, UNSPECIFIED: ICD-10-CM

## 2018-02-06 LAB
ANION GAP SERPL CALC-SCNC: 8 MMOL/L — SIGNIFICANT CHANGE UP (ref 5–17)
BUN SERPL-MCNC: 22 MG/DL — HIGH (ref 7–18)
CALCIUM SERPL-MCNC: 8.4 MG/DL — SIGNIFICANT CHANGE UP (ref 8.4–10.5)
CHLORIDE SERPL-SCNC: 100 MMOL/L — SIGNIFICANT CHANGE UP (ref 96–108)
CO2 SERPL-SCNC: 28 MMOL/L — SIGNIFICANT CHANGE UP (ref 22–31)
CREAT SERPL-MCNC: 0.92 MG/DL — SIGNIFICANT CHANGE UP (ref 0.5–1.3)
GLUCOSE SERPL-MCNC: 210 MG/DL — HIGH (ref 70–99)
HCT VFR BLD CALC: 47.1 % — SIGNIFICANT CHANGE UP (ref 39–50)
HGB BLD-MCNC: 14.7 G/DL — SIGNIFICANT CHANGE UP (ref 13–17)
INR BLD: 0.96 RATIO — SIGNIFICANT CHANGE UP (ref 0.88–1.16)
MAGNESIUM SERPL-MCNC: 2.5 MG/DL — SIGNIFICANT CHANGE UP (ref 1.6–2.6)
MCHC RBC-ENTMCNC: 31.2 GM/DL — LOW (ref 32–36)
MCHC RBC-ENTMCNC: 31.7 PG — SIGNIFICANT CHANGE UP (ref 27–34)
MCV RBC AUTO: 101.6 FL — HIGH (ref 80–100)
PHOSPHATE SERPL-MCNC: 2.9 MG/DL — SIGNIFICANT CHANGE UP (ref 2.5–4.5)
PLATELET # BLD AUTO: 289 K/UL — SIGNIFICANT CHANGE UP (ref 150–400)
POTASSIUM SERPL-MCNC: 3.8 MMOL/L — SIGNIFICANT CHANGE UP (ref 3.5–5.3)
POTASSIUM SERPL-SCNC: 3.8 MMOL/L — SIGNIFICANT CHANGE UP (ref 3.5–5.3)
PROTHROM AB SERPL-ACNC: 10.5 SEC — SIGNIFICANT CHANGE UP (ref 9.8–12.7)
RBC # BLD: 4.63 M/UL — SIGNIFICANT CHANGE UP (ref 4.2–5.8)
RBC # FLD: 12.6 % — SIGNIFICANT CHANGE UP (ref 10.3–14.5)
SODIUM SERPL-SCNC: 136 MMOL/L — SIGNIFICANT CHANGE UP (ref 135–145)
WBC # BLD: 17 K/UL — HIGH (ref 3.8–10.5)
WBC # FLD AUTO: 17 K/UL — HIGH (ref 3.8–10.5)

## 2018-02-06 PROCEDURE — 82550 ASSAY OF CK (CPK): CPT

## 2018-02-06 PROCEDURE — 87798 DETECT AGENT NOS DNA AMP: CPT

## 2018-02-06 PROCEDURE — 87633 RESP VIRUS 12-25 TARGETS: CPT

## 2018-02-06 PROCEDURE — 83735 ASSAY OF MAGNESIUM: CPT

## 2018-02-06 PROCEDURE — 93005 ELECTROCARDIOGRAM TRACING: CPT

## 2018-02-06 PROCEDURE — 87581 M.PNEUMON DNA AMP PROBE: CPT

## 2018-02-06 PROCEDURE — 94640 AIRWAY INHALATION TREATMENT: CPT

## 2018-02-06 PROCEDURE — 80076 HEPATIC FUNCTION PANEL: CPT

## 2018-02-06 PROCEDURE — 80048 BASIC METABOLIC PNL TOTAL CA: CPT

## 2018-02-06 PROCEDURE — 83690 ASSAY OF LIPASE: CPT

## 2018-02-06 PROCEDURE — 96375 TX/PRO/DX INJ NEW DRUG ADDON: CPT

## 2018-02-06 PROCEDURE — 82553 CREATINE MB FRACTION: CPT

## 2018-02-06 PROCEDURE — 84100 ASSAY OF PHOSPHORUS: CPT

## 2018-02-06 PROCEDURE — 83880 ASSAY OF NATRIURETIC PEPTIDE: CPT

## 2018-02-06 PROCEDURE — 85730 THROMBOPLASTIN TIME PARTIAL: CPT

## 2018-02-06 PROCEDURE — 99291 CRITICAL CARE FIRST HOUR: CPT | Mod: 25

## 2018-02-06 PROCEDURE — 80053 COMPREHEN METABOLIC PANEL: CPT

## 2018-02-06 PROCEDURE — 82803 BLOOD GASES ANY COMBINATION: CPT

## 2018-02-06 PROCEDURE — 99223 1ST HOSP IP/OBS HIGH 75: CPT | Mod: AI,GC

## 2018-02-06 PROCEDURE — 71045 X-RAY EXAM CHEST 1 VIEW: CPT

## 2018-02-06 PROCEDURE — 85027 COMPLETE CBC AUTOMATED: CPT

## 2018-02-06 PROCEDURE — 87486 CHLMYD PNEUM DNA AMP PROBE: CPT

## 2018-02-06 PROCEDURE — 96374 THER/PROPH/DIAG INJ IV PUSH: CPT

## 2018-02-06 PROCEDURE — 84484 ASSAY OF TROPONIN QUANT: CPT

## 2018-02-06 PROCEDURE — 87040 BLOOD CULTURE FOR BACTERIA: CPT

## 2018-02-06 PROCEDURE — 83605 ASSAY OF LACTIC ACID: CPT

## 2018-02-06 PROCEDURE — 85610 PROTHROMBIN TIME: CPT

## 2018-02-06 RX ORDER — ALBUTEROL 90 UG/1
2 AEROSOL, METERED ORAL
Qty: 1 | Refills: 0 | OUTPATIENT
Start: 2018-02-06 | End: 2018-03-07

## 2018-02-06 RX ORDER — THIAMINE MONONITRATE (VIT B1) 100 MG
1 TABLET ORAL
Qty: 30 | Refills: 0 | OUTPATIENT
Start: 2018-02-06 | End: 2018-03-07

## 2018-02-06 RX ORDER — CEFUROXIME AXETIL 250 MG
1 TABLET ORAL
Qty: 10 | Refills: 0
Start: 2018-02-06 | End: 2018-02-10

## 2018-02-06 RX ORDER — BUDESONIDE AND FORMOTEROL FUMARATE DIHYDRATE 160; 4.5 UG/1; UG/1
2 AEROSOL RESPIRATORY (INHALATION)
Qty: 1 | Refills: 0 | OUTPATIENT
Start: 2018-02-06 | End: 2018-03-07

## 2018-02-06 RX ADMIN — ENOXAPARIN SODIUM 40 MILLIGRAM(S): 100 INJECTION SUBCUTANEOUS at 11:36

## 2018-02-06 RX ADMIN — Medication 255 MILLIGRAM(S): at 05:51

## 2018-02-06 RX ADMIN — Medication 100 MILLIGRAM(S): at 11:36

## 2018-02-06 RX ADMIN — BUDESONIDE AND FORMOTEROL FUMARATE DIHYDRATE 2 PUFF(S): 160; 4.5 AEROSOL RESPIRATORY (INHALATION) at 10:59

## 2018-02-06 RX ADMIN — Medication 1 TABLET(S): at 11:37

## 2018-02-06 RX ADMIN — Medication 40 MILLIGRAM(S): at 05:51

## 2018-02-06 RX ADMIN — PANTOPRAZOLE SODIUM 40 MILLIGRAM(S): 20 TABLET, DELAYED RELEASE ORAL at 05:51

## 2018-02-06 RX ADMIN — Medication 40 MILLIGRAM(S): at 15:17

## 2018-02-06 NOTE — PROGRESS NOTE ADULT - ATTENDING COMMENTS
Patient was seen at the bedside earlier today.   He was feeling much better than yesterday.     Vital Signs Last 24 Hrs  T(C): 36.6 (06 Feb 2018 14:36), Max: 36.6 (06 Feb 2018 14:36)  T(F): 97.9 (06 Feb 2018 14:36), Max: 97.9 (06 Feb 2018 14:36)  HR: 82 (06 Feb 2018 14:36) (66 - 87)  BP: 133/58 (06 Feb 2018 14:36) (121/82 - 151/60)  BP(mean): --  RR: 18 (06 Feb 2018 14:36) (17 - 18)  SpO2: 94% (06 Feb 2018 14:36) (94% - 95%)  Lungs, decreased bs    Pulmonary consultation, seen and appreciated.     IMP:  COPD exacerbation, good response to steroids and bronchodilators.           Anxiety, improved after improvement of condition and planned discharge.           Tobacco use > 30 pack years, indication for high resolution CT for lung cancer screening  Plan:  Discharge.  Follow up PMD and Pulmonary.  I have contacted patient at home to ask him to have high resolution CT screening for lung cancer electively.

## 2018-02-06 NOTE — PROGRESS NOTE ADULT - PROBLEM SELECTOR PLAN 3
Quit three days ago.  I have emphasized importance of staying tobacco free.   I have called him at home to tell him about guidelines for high-resolution CT scanning, which should be done to seek lung cancer in long-term smokers.  He has agreed to follow up with PMD and pulmonary.

## 2018-02-06 NOTE — CONSULT NOTE ADULT - ASSESSMENT
COPD with Ac Exacerbation      PLAN; Pt adv to quit smoking and Drinking, Adv nicotrol patch and gum  Po prednisone 40 mg qd x 5 days  Po antibiotics x 5 days more  [id number]/4.5 2 puffs bid  Spiriva 18 mcg qd   Proair inhaler 2 puffs q6h prn  INJ FLU and Pneumovax before D/c  Pft as out pt  Thanks for consult

## 2018-02-06 NOTE — PROGRESS NOTE ADULT - PROBLEM SELECTOR PLAN 2
IMPROVE VTE Individual Risk Assessment    RISK                                                          Points  [] Previous VTE                                           3  [] Thrombophilia                                        2  [] Lower limb paralysis                              2   [] Current Cancer                                       2   [x] Immobilization > 24 hrs                        1  [] ICU/CCU stay > 24 hours                       1  [x] Age > 60                                                   1    IMPROVE VTE Score: 2    need for DVT ppx , on lovenox 40mg, daily  need for GI ppx, on Protonix

## 2018-02-06 NOTE — PROGRESS NOTE ADULT - SUBJECTIVE AND OBJECTIVE BOX
Patient is a 72y old  Male who presents with a chief complaint of shortness of breath (05 Feb 2018 12:01)    HPI:  73 yo male from home with PMH COPD (recently diagnosed 09/2017, not on any meds/ home O2) , BIBA to ED c/o shortness of breath. pt had dry cough since Friday, getting worse today, pt went to Urgent care showed O2 86% on room air, given one duoneb treatment, not improving then was sent to ED. Pt had b/l mild wheezing, left > right. Pt denied chest pain, no nasal congestion, no nausea, vomiting diarrhea, constipation, no illness contact or any other complains. Pt had no flu vaccine this year.     In ER, pt temp 98.8. HR 86, /79, RR 20 , SO2 88% on room air, EKG NSR, leukocytosis WBC 14.5, ABG 7.38/49/59/28, CXR shows scaring linear atelectasis at the left costophrenic angle, s/p zithromax x1 and rocephin x1, s/p duoneb x1, IV steroids 80mg x1 , pt on NC 3L now, SO2 94%. (04 Feb 2018 20:27)      INTERVAL HPI/OVERNIGHT EVENTS:    patient is feeling much better.   T(C): 36.6 (02-06-18 @ 14:36), Max: 36.6 (02-06-18 @ 14:36)  HR: 82 (02-06-18 @ 14:36) (66 - 87)  BP: 133/58 (02-06-18 @ 14:36) (121/82 - 151/60)  RR: 18 (02-06-18 @ 14:36) (17 - 18)  SpO2: 94% (02-06-18 @ 14:36) (94% - 95%)  Wt(kg): --  I&O's Summary      REVIEW OF SYSTEMS: denies fever, chills, SOB, palpitations, chest pain, abdominal pain, nausea, vomitting, diarrhea, constipation, dizziness    MEDICATIONS  (STANDING):  buDESOnide 160 MICROgram(s)/formoterol 4.5 MICROgram(s) Inhaler 2 Puff(s) Inhalation two times a day  enoxaparin Injectable 40 milliGRAM(s) SubCutaneous daily  multivitamin 1 Tablet(s) Oral daily  pantoprazole    Tablet 40 milliGRAM(s) Oral before breakfast  predniSONE   Tablet 40 milliGRAM(s) Oral daily  pyridoxine 100 milliGRAM(s) Oral daily  thiamine IVPB 500 milliGRAM(s) IV Intermittent three times a day    MEDICATIONS  (PRN):  LORazepam     Tablet 1 milliGRAM(s) Oral every 2 hours PRN CIWA-Ar score increase by 2 points and a total score of 7 or less      PHYSICAL EXAM:  GENERAL: NAD, breathing better  HEAD:  Atraumatic, Normocephalic  EYES: EOMI, PERRLA, conjunctiva and sclera clear  ENMT: No tonsillar erythema, exudates, or enlargement; Moist mucous membranes, Good dentition, No lesions  NECK: Supple, No JVD, Normal thyroid  CHEST/LUNG: Decreased breath sounds, no wheezing   HEART:  S1 S1, no murmur appreciated  ABDOMEN: Soft, Nontender, Nondistended; Bowel sounds present  EXTREMITIES:  2+ Peripheral Pulses, No clubbing, cyanosis, or edema  LYMPH: No lymphadenopathy noted  SKIN: No rashes or lesions  NERVOUS SYSTEM:  Alert & Oriented X3, Good concentration; Motor Strength 5/5 B/L upper and lower extremities  LABS:                        14.7   17.0  )-----------( 289      ( 06 Feb 2018 07:31 )             47.1     02-06    136  |  100  |  22<H>  ----------------------------<  210<H>  3.8   |  28  |  0.92    Ca    8.4      06 Feb 2018 07:31  Phos  2.9     02-06  Mg     2.5     02-06    TPro  7.9  /  Alb  3.8  /  TBili  0.3  /  DBili  0.1  /  AST  28  /  ALT  23  /  AlkPhos  94  02-05    PT/INR - ( 06 Feb 2018 07:31 )   PT: 10.5 sec;   INR: 0.96 ratio

## 2018-02-06 NOTE — PROGRESS NOTE ADULT - ASSESSMENT
73 yo male from home with PMH COPD (recently diagnosed 09/2017, not on any meds/ home O2) , BIBA to ED c/o shortness of breath. pt had dry cough since Friday, getting worse today, pt went to Urgent care showed O2 86% on room air, given one duoneb treatment, not improving then was sent to ED. Pt had b/l mild wheezing, left > right.     In ER, pt temp 98.8. HR 86, /79, RR 20 , SO2 88% on room air, leukocytosis WBC 14.5, ABG 7.38/49/59/28, CXR shows scaring linear atelectasis at the left costophrenic angle, s/p zithromax x1 and rocephin x1, s/p duoneb x1, IV steroids 80mg x1 , pt on NC 3L now, SO2 94%.     Pt will be admitted to medicine for COPD exacerbation.

## 2018-02-06 NOTE — PROGRESS NOTE ADULT - PROBLEM SELECTOR PLAN 1
pt temp 98.8. HR 86, /79, RR 20 , SO2 88% on room air, b/l wheezing, R>L  most likely 2/2 infection virus vs bacteria   aferbir leukocytosis , EKG NSR  ABG 7.38/49/59/28 on admission  CXR shows scaring linear atelectasis at the left costophrenic angle   s/p zithromax x1 and rocephin x1, s/p duoneb x1, IV steroids 80mg x1 in ER  pt on NC 3L for now  c/w IV steroids 40mg bid, will taper to PO steroids  c/w duonebs, albuterol inhaler PRN   f/u RVP, Blood culture, UA

## 2018-02-06 NOTE — CONSULT NOTE ADULT - SUBJECTIVE AND OBJECTIVE BOX
PULMONARY CONSULT NOTE      [facility]  MRN-[id number]    Patient is a 72y old  Male who presents with a chief complaint of shortness of breath (05 Feb 2018 12:01) for a few days asso with cough with expect ;feels better today and anxious   to go home      HISTORY OF PRESENT ILLNESS:73 yo male from home with PMH COPD (recently diagnosed 09/2017, not on any meds/ home O2) , BIBA to ED c/o shortness of breath. pt had dry cough since Friday, getting worse today, pt went to Urgent care showed O2 86% on room air, given one duoneb treatment, not improving then was sent to ED. Pt had b/l mild wheezing, left > right. Pt denied chest pain, no nasal congestion, no nausea, vomiting diarrhea, constipation, no illness contact or any other complains. Pt had no flu vaccine this year.     Home Medications: proventil inhaler prn      MEDICATIONS  (STANDING):  buDESOnide 160 MICROgram(s)/formoterol 4.5 MICROgram(s) Inhaler 2 Puff(s) Inhalation two times a day  enoxaparin Injectable 40 milliGRAM(s) SubCutaneous daily  methylPREDNISolone sodium succinate Injectable 40 milliGRAM(s) IV Push every 8 hours  multivitamin 1 Tablet(s) Oral daily  pantoprazole    Tablet 40 milliGRAM(s) Oral before breakfast  predniSONE   Tablet 40 milliGRAM(s) Oral daily  pyridoxine 100 milliGRAM(s) Oral daily  thiamine IVPB 500 milliGRAM(s) IV Intermittent three times a day        Allergies    No Known Allergies          PAST MEDICAL & SURGICAL HISTORY:  S/P tonsillectomy  Copd told sept 2017      FAMILY HISTORY:  No pertinent family history in first degree relatives  HTN --   DM--   IHD--   Asthma--  COPD --    SOCIAL HISTORY SMOKING 2PPD x 44 yrs quit 72 hrs ago  ETOH +    DRUGS--    REVIEW OF SYSTEMS:  CONSTITUTIONAL: No fever, weight loss, or fatigue   EYES: No eye pain, visual disturbances, or discharge  ENT:  No difficulty hearing, tinnitus, vertigo; No sinus or throat pain  NECK: No pain or stiffness   RESPIRATORY:  cough+   wheezing +  chills--   hemoptysis --   Shortness of Breath+=  CARDIOVASCULAR: No chest pain, palpitations, passing out, dizziness, or leg swelling  GASTROINTESTINAL: No abdominal or epigastric pain. No nausea, vomiting, or hematemesis; No diarrhea or constipation. No melena or hematochezia.  GENITOURINARY: No dysuria, frequency, hematuria, or incontinence  NEUROLOGICAL: No headaches, memory loss, loss of strength, numbness, or tremors  SKIN: No itching, burning, rashes, or lesions   LYMPH Nodes: No enlarged glands  ENDOCRINE: No heat or cold intolerance; No hair loss  MUSCULOSKELETAL: No joint pain or swelling; No muscle, back, or extremity pain  PSYCHIATRIC: No depression, anxiety, mood swings, or difficulty sleeping  HEME/LYMPH: No easy bruising, or bleeding gums  ALLERGY AND IMMUNOLOGIC: No hives or eczema      Vital Signs Last 24 Hrs  T(C): 36.3 (06 Feb 2018 04:40), Max: 36.8 (05 Feb 2018 16:26)  T(F): 97.4 (06 Feb 2018 04:40), Max: 98.2 (05 Feb 2018 16:26)  HR: 66 (06 Feb 2018 04:40) (66 - 87)  BP: 121/82 (06 Feb 2018 04:40) (121/82 - 151/60)  BP(mean): --  RR: 18 (06 Feb 2018 04:40) (17 - 18)  SpO2: 95% (06 Feb 2018 04:40) (94% - 100%)  I&O's Detail      PHYSICAL EXAMINATION:    GENERAL: The patient is a well-developed, well-nourished in no apparent distress.   SKIN: No rashes ecchymoses or cyanosis  HEENT: Head is normocephalic and atraumatic. Extraocular muscles are intact. Mucous membranes are moist.   Neck supple LN not felt, JVP not increased  Thyroid not enlarged  Lymphatic: No lymphadenopathy  Cardiovascular:  S1 S2  heard ,RSR , JVP not increased , syst murmur at apex, No  gallop or rub  Respiratory:  Symmetrical chest wall movements Breathing vesicular , Percussion note normal no dulness , Increased AP diameter   with end expiratory  wheeze, no rales  ABDOMEN:  Soft, Non-tender,   No  hepatosplenomegaly ,BS positive		  Extremities: Normal range of motion, No clubbing, cyanosis or edema , No calf tenderness  Vascular: Peripheral pulses palpable 2+ bilaterally  CNS: Alert and oriented x3,  Mood and affect appropriate  Cranial nerves intact  sensory intact  motor Power5/5, DTR 2+   Babinski neg    LABS:                        14.7   17.0  )-----------( 289      ( 06 Feb 2018 07:31 )             47.1     02-06    136  |  100  |  22<H>  ----------------------------<  210<H>  3.8   |  28  |  0.92    Ca    8.4      06 Feb 2018 07:31  Phos  2.9     02-06  Mg     2.5     02-06    TPro  7.9  /  Alb  3.8  /  TBili  0.3  /  DBili  0.1  /  AST  28  /  ALT  23  /  AlkPhos  94  02-05    PT/INR - ( 06 Feb 2018 07:31 )   PT: 10.5 sec;   INR: 0.96 ratio         PTT - ( 04 Feb 2018 15:50 )  PTT:30.1 sec    ABG - ( 04 Feb 2018 16:33 )  pH: 7.38  /  pCO2: 49    /  pO2: 59    / HCO3: 28    / Base Excess: 2.7   /  SaO2: 92                CARDIAC MARKERS ( 05 Feb 2018 00:25 )  <0.015 ng/mL / x     / x     / x     / x      CARDIAC MARKERS ( 04 Feb 2018 15:50 )  <0.015 ng/mL / x     / 178 U/L / x     / 6.3 ng/mL        Serum Pro-Brain Natriuretic Peptide: 305 pg/mL (02-04-18 @ 15:50)    MICROBIOLOGY:    Culture - Blood (collected 02-04-18 @ 22:20)  Source: .Blood Blood  Preliminary Report (02-05-18 @ 23:01):    No growth to date.    Culture - Blood (collected 02-04-18 @ 22:20)  Source: .Blood Blood  Preliminary Report (02-05-18 @ 23:01):    No growth to date.        RADIOLOGY & ADDITIONAL STUDIES:    CXR: scoliosis  ,scar LLL    ekg; NSR, LAD

## 2018-02-09 LAB
CULTURE RESULTS: SIGNIFICANT CHANGE UP
CULTURE RESULTS: SIGNIFICANT CHANGE UP
SPECIMEN SOURCE: SIGNIFICANT CHANGE UP
SPECIMEN SOURCE: SIGNIFICANT CHANGE UP

## 2018-02-12 ENCOUNTER — APPOINTMENT (OUTPATIENT)
Dept: PULMONOLOGY | Facility: CLINIC | Age: 73
End: 2018-02-12
Payer: MEDICARE

## 2018-02-12 VITALS
DIASTOLIC BLOOD PRESSURE: 66 MMHG | HEART RATE: 82 BPM | TEMPERATURE: 97.9 F | SYSTOLIC BLOOD PRESSURE: 140 MMHG | RESPIRATION RATE: 18 BRPM | BODY MASS INDEX: 24.78 KG/M2 | OXYGEN SATURATION: 92 % | WEIGHT: 163 LBS

## 2018-02-12 PROCEDURE — 99406 BEHAV CHNG SMOKING 3-10 MIN: CPT

## 2018-02-12 PROCEDURE — 99215 OFFICE O/P EST HI 40 MIN: CPT | Mod: 25

## 2018-03-05 ENCOUNTER — FORM ENCOUNTER (OUTPATIENT)
Age: 73
End: 2018-03-05

## 2018-03-06 ENCOUNTER — APPOINTMENT (OUTPATIENT)
Dept: RADIOLOGY | Facility: IMAGING CENTER | Age: 73
End: 2018-03-06

## 2018-03-06 ENCOUNTER — OUTPATIENT (OUTPATIENT)
Dept: OUTPATIENT SERVICES | Facility: HOSPITAL | Age: 73
LOS: 1 days | End: 2018-03-06
Payer: MEDICARE

## 2018-03-06 DIAGNOSIS — J44.9 CHRONIC OBSTRUCTIVE PULMONARY DISEASE, UNSPECIFIED: ICD-10-CM

## 2018-03-06 DIAGNOSIS — Z90.89 ACQUIRED ABSENCE OF OTHER ORGANS: Chronic | ICD-10-CM

## 2018-03-06 PROCEDURE — 71046 X-RAY EXAM CHEST 2 VIEWS: CPT

## 2018-03-06 PROCEDURE — 71046 X-RAY EXAM CHEST 2 VIEWS: CPT | Mod: 26

## 2018-03-12 ENCOUNTER — APPOINTMENT (OUTPATIENT)
Dept: PULMONOLOGY | Facility: CLINIC | Age: 73
End: 2018-03-12
Payer: MEDICARE

## 2018-03-12 VITALS
OXYGEN SATURATION: 95 % | HEART RATE: 85 BPM | TEMPERATURE: 97.6 F | DIASTOLIC BLOOD PRESSURE: 60 MMHG | SYSTOLIC BLOOD PRESSURE: 126 MMHG | RESPIRATION RATE: 16 BRPM | WEIGHT: 171 LBS | BODY MASS INDEX: 26 KG/M2

## 2018-03-12 PROCEDURE — 99214 OFFICE O/P EST MOD 30 MIN: CPT

## 2018-04-06 ENCOUNTER — RX RENEWAL (OUTPATIENT)
Age: 73
End: 2018-04-06

## 2018-06-04 NOTE — DISCHARGE NOTE ADULT - THE PATIENT HAS
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   I further explained that this condition is a severe eye disease which should be monitored and treated by a retinal specialist. no difficulties

## 2018-06-11 ENCOUNTER — APPOINTMENT (OUTPATIENT)
Dept: PULMONOLOGY | Facility: CLINIC | Age: 73
End: 2018-06-11
Payer: MEDICARE

## 2018-06-11 VITALS
BODY MASS INDEX: 26.61 KG/M2 | RESPIRATION RATE: 16 BRPM | TEMPERATURE: 97.6 F | HEART RATE: 94 BPM | DIASTOLIC BLOOD PRESSURE: 64 MMHG | OXYGEN SATURATION: 94 % | WEIGHT: 175 LBS | SYSTOLIC BLOOD PRESSURE: 144 MMHG

## 2018-06-11 PROCEDURE — 94727 GAS DIL/WSHOT DETER LNG VOL: CPT

## 2018-06-11 PROCEDURE — 94060 EVALUATION OF WHEEZING: CPT

## 2018-06-11 PROCEDURE — 99215 OFFICE O/P EST HI 40 MIN: CPT | Mod: 25

## 2018-06-11 PROCEDURE — 94729 DIFFUSING CAPACITY: CPT

## 2018-06-22 ENCOUNTER — RX RENEWAL (OUTPATIENT)
Age: 73
End: 2018-06-22

## 2018-06-22 DIAGNOSIS — M10.9 GOUT, UNSPECIFIED: ICD-10-CM

## 2018-06-22 RX ORDER — DOXYCYCLINE 100 MG/1
100 CAPSULE ORAL
Qty: 14 | Refills: 1 | Status: COMPLETED | COMMUNITY
Start: 2018-02-12 | End: 2018-06-22

## 2018-09-13 ENCOUNTER — APPOINTMENT (OUTPATIENT)
Dept: PULMONOLOGY | Facility: CLINIC | Age: 73
End: 2018-09-13
Payer: MEDICARE

## 2018-09-13 VITALS
SYSTOLIC BLOOD PRESSURE: 122 MMHG | HEART RATE: 85 BPM | RESPIRATION RATE: 16 BRPM | TEMPERATURE: 97.9 F | OXYGEN SATURATION: 97 % | DIASTOLIC BLOOD PRESSURE: 78 MMHG | HEIGHT: 68 IN | WEIGHT: 180 LBS | BODY MASS INDEX: 27.28 KG/M2

## 2018-09-13 PROCEDURE — 99214 OFFICE O/P EST MOD 30 MIN: CPT

## 2018-10-15 ENCOUNTER — FORM ENCOUNTER (OUTPATIENT)
Age: 73
End: 2018-10-15

## 2018-10-16 ENCOUNTER — OUTPATIENT (OUTPATIENT)
Dept: OUTPATIENT SERVICES | Facility: HOSPITAL | Age: 73
LOS: 1 days | End: 2018-10-16
Payer: MEDICARE

## 2018-10-16 ENCOUNTER — APPOINTMENT (OUTPATIENT)
Dept: CT IMAGING | Facility: IMAGING CENTER | Age: 73
End: 2018-10-16
Payer: MEDICARE

## 2018-10-16 DIAGNOSIS — Z00.8 ENCOUNTER FOR OTHER GENERAL EXAMINATION: ICD-10-CM

## 2018-10-16 DIAGNOSIS — Z90.89 ACQUIRED ABSENCE OF OTHER ORGANS: Chronic | ICD-10-CM

## 2018-10-16 PROCEDURE — 71250 CT THORAX DX C-: CPT | Mod: 26

## 2018-10-16 PROCEDURE — 71250 CT THORAX DX C-: CPT

## 2018-11-16 ENCOUNTER — RX CHANGE (OUTPATIENT)
Age: 73
End: 2018-11-16

## 2019-03-14 ENCOUNTER — APPOINTMENT (OUTPATIENT)
Dept: PULMONOLOGY | Facility: CLINIC | Age: 74
End: 2019-03-14
Payer: MEDICARE

## 2019-03-14 VITALS
RESPIRATION RATE: 18 BRPM | DIASTOLIC BLOOD PRESSURE: 68 MMHG | TEMPERATURE: 98.1 F | OXYGEN SATURATION: 96 % | SYSTOLIC BLOOD PRESSURE: 144 MMHG | BODY MASS INDEX: 26.61 KG/M2 | WEIGHT: 175 LBS | HEART RATE: 79 BPM

## 2019-03-14 PROCEDURE — 99214 OFFICE O/P EST MOD 30 MIN: CPT

## 2019-03-14 NOTE — HISTORY OF PRESENT ILLNESS
[FreeTextEntry1] : He is feeling well. No cough, wheezing or shortness of breath. He is concerned that he has gained weight. On Symbicort. Also on ProAir. Uses it rarely. Smokes 4-5 cigarettes per day. \par \par

## 2019-03-14 NOTE — DISCUSSION/SUMMARY
[FreeTextEntry1] : He is a 73 year-old man with moderate COPD.\par \par His COPD is stable. He has been doing well on Symbicort. He was advised to continue with this.\par \par A CT examination of the chest from September 2017 demonstrated a 4 mm nodule in the right upper lobe. Follow up CT October 2018 did not show any nodules. \par \par Influenza and pneumonia vaccinations discussed. He does not want any vaccinations. 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 \par \par Follow up in six months. Sooner if needed.

## 2019-03-14 NOTE — PHYSICAL EXAM
[Normal Appearance] : normal appearance [General Appearance - In No Acute Distress] : no acute distress [Neck Cervical Mass (___cm)] : no neck mass was observed [Jugular Venous Distention Increased] : there was no jugular-venous distention [Heart Rate And Rhythm] : heart rate and rhythm were normal [Heart Sounds] : normal S1 and S2 [Auscultation Breath Sounds / Voice Sounds] : lungs were clear to auscultation bilaterally [Bowel Sounds] : normal bowel sounds [Abdomen Soft] : soft [Abnormal Walk] : normal gait [Nail Clubbing] : no clubbing of the fingernails [Cyanosis, Localized] : no localized cyanosis [Skin Turgor] : normal skin turgor [No Focal Deficits] : no focal deficits [Oriented To Time, Place, And Person] : oriented to person, place, and time [Affect] : the affect was normal [] : no respiratory distress [Erythema] : no erythema of the pharynx

## 2019-03-14 NOTE — REVIEW OF SYSTEMS
[Dyspnea] : dyspnea [Fever] : no fever [Fatigue] : no fatigue [Cough] : no cough [Hemoptysis] : no hemoptysis [Chest Tightness] : no chest tightness [Pleuritic Pain] : no pleuritic pain [Hypertension] : no ~T hypertension [Chest Discomfort] : no chest discomfort [PND] : no PND [Palpitations] : no palpitations [Edema] : ~T edema was not present [Back Pain] : ~T no back pain [Myalgias] : no myalgias [Rash] : no [unfilled] rash [Anemia] : no anemia [Depression] : no depression [Diabetes] : no diabetes mellitus [Difficulty Initiating Sleep] : no difficulty falling asleep

## 2019-03-14 NOTE — PROCEDURE
[FreeTextEntry1] : Pulmonary function study of June 11, 2018 demonstrated moderate obstructive pulmonary disease. There is mild restriction. There was a moderate reduction in diffusion. No significant improvement was noted after inhalation of bronchodilator.\par \par A CT examination of the chest performed on September 1, 2017 at [facility] demonstrated a 4 mm nodule in the right upper lobe. Also there were findings consistent with COPD and biapical pleural thickening. There was a left upper lobe granuloma.\par \par CT examination of the chest performed on October 16, 2018 demonstrated centrilobular emphysema. No nodules are noted. Lingular scarring was present.\par \par \par \par

## 2019-03-21 ENCOUNTER — RX RENEWAL (OUTPATIENT)
Age: 74
End: 2019-03-21

## 2019-09-12 ENCOUNTER — APPOINTMENT (OUTPATIENT)
Dept: PULMONOLOGY | Facility: CLINIC | Age: 74
End: 2019-09-12
Payer: MEDICARE

## 2019-09-12 VITALS
BODY MASS INDEX: 27.82 KG/M2 | HEIGHT: 69.29 IN | HEART RATE: 92 BPM | DIASTOLIC BLOOD PRESSURE: 80 MMHG | TEMPERATURE: 98 F | OXYGEN SATURATION: 94 % | WEIGHT: 190 LBS | SYSTOLIC BLOOD PRESSURE: 160 MMHG | RESPIRATION RATE: 17 BRPM

## 2019-09-12 PROCEDURE — 94727 GAS DIL/WSHOT DETER LNG VOL: CPT

## 2019-09-12 PROCEDURE — 94729 DIFFUSING CAPACITY: CPT

## 2019-09-12 PROCEDURE — 94060 EVALUATION OF WHEEZING: CPT

## 2019-09-12 PROCEDURE — 99215 OFFICE O/P EST HI 40 MIN: CPT | Mod: 25

## 2019-09-12 NOTE — PROCEDURE
[FreeTextEntry1] : Pulmonary function study of June 11, 2018 demonstrated moderate obstructive pulmonary disease. There is mild restriction. There was a moderate reduction in diffusion. No significant improvement was noted after inhalation of bronchodilator.\par \par PFT 9/12/19: Moderate obstruction. Mild restriction noted. Diffusion was moderately reduced. \par \par A CT examination of the chest performed on September 1, 2017 at [facility] demonstrated a 4 mm nodule in the right upper lobe. Also there were findings consistent with COPD and biapical pleural thickening. There was a left upper lobe granuloma.\par \par CT examination of the chest performed on October 16, 2018 demonstrated centrilobular emphysema. No nodules are noted. Lingular scarring was present.

## 2019-09-12 NOTE — HISTORY OF PRESENT ILLNESS
[FreeTextEntry1] : He is feeling well. No cough, wheezing or shortness of breath. Continues to gain weight. \par \par He is on Symbicort. Does not use Ventolin often. Less than once a week. \par \par He has not been smoking. \par

## 2019-09-12 NOTE — DISCUSSION/SUMMARY
[FreeTextEntry1] : He is a 73 year-old man with moderate COPD.He stopped smoking and has gained weight. \par \par His COPD is stable. He has been doing well on Symbicort. He was advised to continue with this.\par \par Weight loss and regular exercise advised. \par \par Follow up in six months. Sooner if needed.

## 2019-09-12 NOTE — REVIEW OF SYSTEMS
[Dyspnea] : dyspnea [Fever] : no fever [Fatigue] : no fatigue [Nasal Congestion] : no nasal congestion [Cough] : no cough [Hemoptysis] : no hemoptysis [Hypertension] : no ~T hypertension [Chest Discomfort] : no chest discomfort [PND] : no PND [Palpitations] : no palpitations [Edema] : ~T edema was not present [Back Pain] : ~T no back pain [Myalgias] : no myalgias [Rash] : no [unfilled] rash [Anemia] : no anemia [Depression] : no depression [Difficulty Initiating Sleep] : no difficulty falling asleep [Diabetes] : no diabetes mellitus

## 2019-09-12 NOTE — PHYSICAL EXAM
[Normal Appearance] : normal appearance [General Appearance - In No Acute Distress] : no acute distress [Neck Cervical Mass (___cm)] : no neck mass was observed [Heart Sounds] : normal S1 and S2 [Heart Rate And Rhythm] : heart rate and rhythm were normal [Jugular Venous Distention Increased] : there was no jugular-venous distention [Auscultation Breath Sounds / Voice Sounds] : lungs were clear to auscultation bilaterally [Nail Clubbing] : no clubbing of the fingernails [Abnormal Walk] : normal gait [Cyanosis, Localized] : no localized cyanosis [Skin Turgor] : normal skin turgor [Oriented To Time, Place, And Person] : oriented to person, place, and time [No Focal Deficits] : no focal deficits [Affect] : the affect was normal [] : no hepato-splenomegaly [Erythema] : no erythema of the pharynx

## 2019-12-07 ENCOUNTER — RX RENEWAL (OUTPATIENT)
Age: 74
End: 2019-12-07

## 2020-01-15 ENCOUNTER — RX RENEWAL (OUTPATIENT)
Age: 75
End: 2020-01-15

## 2020-01-23 ENCOUNTER — RX RENEWAL (OUTPATIENT)
Age: 75
End: 2020-01-23

## 2020-03-19 ENCOUNTER — APPOINTMENT (OUTPATIENT)
Dept: PULMONOLOGY | Facility: CLINIC | Age: 75
End: 2020-03-19
Payer: MEDICARE

## 2020-03-19 VITALS
HEART RATE: 88 BPM | SYSTOLIC BLOOD PRESSURE: 142 MMHG | TEMPERATURE: 98.3 F | RESPIRATION RATE: 17 BRPM | BODY MASS INDEX: 26.36 KG/M2 | WEIGHT: 180 LBS | DIASTOLIC BLOOD PRESSURE: 80 MMHG | OXYGEN SATURATION: 94 %

## 2020-03-19 PROCEDURE — 99214 OFFICE O/P EST MOD 30 MIN: CPT

## 2020-03-19 NOTE — PROCEDURE
[FreeTextEntry1] : PFT 6/11/18: Moderate obstructive pulmonary disease. There is mild restriction. There was a moderate reduction in diffusion. No significant improvement was noted after inhalation of bronchodilator.\par \par PFT 9/12/19: Moderate obstruction. Mild restriction noted. Diffusion was moderately reduced. \par \par CT Chest 9/1/17: A 4 mm nodule in the right upper lobe. Also there were findings consistent with COPD and biapical pleural thickening. There was a left upper lobe granuloma.\par \par CT Chest 10/16/18: Centrilobular emphysema was presented. No nodules are noted. Lingular scarring was present.

## 2020-03-19 NOTE — HISTORY OF PRESENT ILLNESS
[FreeTextEntry1] : He is feeling well. \par \par He denied any cough, wheezing or shortness of breath. \par \par He is on Symbicort. Has not needed to use Ventolin.\par \par He has not been smoking.

## 2020-03-19 NOTE — REVIEW OF SYSTEMS
[Dyspnea] : dyspnea [Fatigue] : no fatigue [Nasal Congestion] : no nasal congestion [Cough] : no cough [Sputum] : not coughing up ~M sputum [Hemoptysis] : no hemoptysis [Hypertension] : no ~T hypertension [Chest Discomfort] : no chest discomfort [PND] : no PND [Palpitations] : no palpitations [Edema] : ~T edema was not present [Heartburn] : no heartburn [Back Pain] : ~T no back pain [Myalgias] : no myalgias [Rash] : no [unfilled] rash [Anemia] : no anemia [Depression] : no depression [Diabetes] : no diabetes mellitus [Difficulty Initiating Sleep] : no difficulty falling asleep [Snoring] : no snoring

## 2020-03-19 NOTE — DISCUSSION/SUMMARY
[FreeTextEntry1] : He is a 74 year-old man with moderate COPD. He has stopped smoking and has gained weight. \par \par His COPD is stable. He has been doing well on Symbicort. He was advised to continue with this and albuterol as needed. Weight loss and regular exercise advised. \par \par His gout has been active. Indomethacin was renewed for him. \par \par Advised to find a PCP. \par \par Follow up in six months. Sooner if needed.

## 2020-03-19 NOTE — PHYSICAL EXAM
[Normal Appearance] : normal appearance [General Appearance - In No Acute Distress] : no acute distress [Neck Cervical Mass (___cm)] : no neck mass was observed [Jugular Venous Distention Increased] : there was no jugular-venous distention [Heart Sounds] : normal S1 and S2 [Auscultation Breath Sounds / Voice Sounds] : lungs were clear to auscultation bilaterally [Abnormal Walk] : normal gait [Nail Clubbing] : no clubbing of the fingernails [Cyanosis, Localized] : no localized cyanosis [Skin Turgor] : normal skin turgor [] : no rash [No Focal Deficits] : no focal deficits [Oriented To Time, Place, And Person] : oriented to person, place, and time [Affect] : the affect was normal [Erythema] : no erythema of the pharynx

## 2020-09-01 VITALS — BODY MASS INDEX: 25.77 KG/M2 | WEIGHT: 180 LBS | HEIGHT: 70 IN

## 2020-09-01 DIAGNOSIS — F17.200 NICOTINE DEPENDENCE, UNSPECIFIED, UNCOMPLICATED: ICD-10-CM

## 2020-09-01 DIAGNOSIS — Z12.2 ENCOUNTER FOR SCREENING FOR MALIGNANT NEOPLASM OF RESPIRATORY ORGANS: ICD-10-CM

## 2020-09-01 NOTE — HISTORY OF PRESENT ILLNESS
[Former] : former smoker [_____ pack-years] : [unfilled] pack-years [TextBox_13] : He denies hemoptysis, denies new cough, denies unexplained weight loss.\par He is a former smoker with a 122 pack year smoking history (2PPD for approx. 61 years).  He quit 2 years ago.  He denies family h/o lung cancer.  He is referred by Dr. Arnulfo Stoll.  Thisis a telephonic visit.\par

## 2020-09-01 NOTE — PLAN
[Smoking Cessation] : smoking cessation [Age appropriate screenings] : Age appropriate screenings [Regular Exercise] : regular exercise [Regular follow-up with healthcare provider] : regular follow-up with healthcare provider [FreeTextEntry1] : His LDCT is scheduled for 9/2/20 at the [facility] location.  He will follow up with Dr. Hassan for the results.

## 2020-09-02 ENCOUNTER — APPOINTMENT (OUTPATIENT)
Dept: CT IMAGING | Facility: IMAGING CENTER | Age: 75
End: 2020-09-02
Payer: MEDICARE

## 2020-09-02 ENCOUNTER — OUTPATIENT (OUTPATIENT)
Dept: OUTPATIENT SERVICES | Facility: HOSPITAL | Age: 75
LOS: 1 days | End: 2020-09-02
Payer: MEDICARE

## 2020-09-02 DIAGNOSIS — Z90.89 ACQUIRED ABSENCE OF OTHER ORGANS: Chronic | ICD-10-CM

## 2020-09-02 DIAGNOSIS — Z00.8 ENCOUNTER FOR OTHER GENERAL EXAMINATION: ICD-10-CM

## 2020-09-02 DIAGNOSIS — Z87.891 PERSONAL HISTORY OF NICOTINE DEPENDENCE: ICD-10-CM

## 2020-09-02 PROCEDURE — G0297: CPT

## 2020-09-02 PROCEDURE — G0297: CPT | Mod: 26

## 2020-09-07 ENCOUNTER — TRANSCRIPTION ENCOUNTER (OUTPATIENT)
Age: 75
End: 2020-09-07

## 2020-09-08 ENCOUNTER — INPATIENT (INPATIENT)
Facility: HOSPITAL | Age: 75
LOS: 1 days | Discharge: HOME HEALTH | DRG: 419 | End: 2020-09-10
Attending: SURGERY | Admitting: SURGERY
Payer: MEDICARE

## 2020-09-08 VITALS
RESPIRATION RATE: 18 BRPM | HEART RATE: 66 BPM | WEIGHT: 176.37 LBS | HEIGHT: 69.69 IN | OXYGEN SATURATION: 96 % | SYSTOLIC BLOOD PRESSURE: 180 MMHG | TEMPERATURE: 98 F | DIASTOLIC BLOOD PRESSURE: 67 MMHG

## 2020-09-08 DIAGNOSIS — K80.00 CALCULUS OF GALLBLADDER WITH ACUTE CHOLECYSTITIS WITHOUT OBSTRUCTION: ICD-10-CM

## 2020-09-08 DIAGNOSIS — Z90.89 ACQUIRED ABSENCE OF OTHER ORGANS: Chronic | ICD-10-CM

## 2020-09-08 LAB
ABO RH CONFIRMATION: SIGNIFICANT CHANGE UP
ALBUMIN SERPL ELPH-MCNC: 3.6 G/DL — SIGNIFICANT CHANGE UP (ref 3.5–5)
ALP SERPL-CCNC: 102 U/L — SIGNIFICANT CHANGE UP (ref 40–120)
ALT FLD-CCNC: 21 U/L DA — SIGNIFICANT CHANGE UP (ref 10–60)
ANION GAP SERPL CALC-SCNC: 3 MMOL/L — LOW (ref 5–17)
APPEARANCE UR: ABNORMAL
APTT BLD: 30.2 SEC — SIGNIFICANT CHANGE UP (ref 27.5–35.5)
AST SERPL-CCNC: 17 U/L — SIGNIFICANT CHANGE UP (ref 10–40)
BASOPHILS # BLD AUTO: 0.06 K/UL — SIGNIFICANT CHANGE UP (ref 0–0.2)
BASOPHILS NFR BLD AUTO: 0.3 % — SIGNIFICANT CHANGE UP (ref 0–2)
BILIRUB SERPL-MCNC: 0.8 MG/DL — SIGNIFICANT CHANGE UP (ref 0.2–1.2)
BILIRUB UR-MCNC: NEGATIVE — SIGNIFICANT CHANGE UP
BUN SERPL-MCNC: 12 MG/DL — SIGNIFICANT CHANGE UP (ref 7–18)
CALCIUM SERPL-MCNC: 9 MG/DL — SIGNIFICANT CHANGE UP (ref 8.4–10.5)
CHLORIDE SERPL-SCNC: 101 MMOL/L — SIGNIFICANT CHANGE UP (ref 96–108)
CO2 SERPL-SCNC: 33 MMOL/L — HIGH (ref 22–31)
COLOR SPEC: YELLOW — SIGNIFICANT CHANGE UP
CREAT SERPL-MCNC: 0.96 MG/DL — SIGNIFICANT CHANGE UP (ref 0.5–1.3)
DIFF PNL FLD: ABNORMAL
EOSINOPHIL # BLD AUTO: 0.01 K/UL — SIGNIFICANT CHANGE UP (ref 0–0.5)
EOSINOPHIL NFR BLD AUTO: 0 % — SIGNIFICANT CHANGE UP (ref 0–6)
GLUCOSE SERPL-MCNC: 120 MG/DL — HIGH (ref 70–99)
GLUCOSE UR QL: 50 MG/DL
HCT VFR BLD CALC: 47.8 % — SIGNIFICANT CHANGE UP (ref 39–50)
HGB BLD-MCNC: 15.7 G/DL — SIGNIFICANT CHANGE UP (ref 13–17)
IMM GRANULOCYTES NFR BLD AUTO: 0.7 % — SIGNIFICANT CHANGE UP (ref 0–1.5)
INR BLD: 1.11 RATIO — SIGNIFICANT CHANGE UP (ref 0.88–1.16)
KETONES UR-MCNC: NEGATIVE — SIGNIFICANT CHANGE UP
LACTATE SERPL-SCNC: 2.1 MMOL/L — HIGH (ref 0.7–2)
LEUKOCYTE ESTERASE UR-ACNC: NEGATIVE — SIGNIFICANT CHANGE UP
LIDOCAIN IGE QN: 97 U/L — SIGNIFICANT CHANGE UP (ref 73–393)
LYMPHOCYTES # BLD AUTO: 0.37 K/UL — LOW (ref 1–3.3)
LYMPHOCYTES # BLD AUTO: 1.7 % — LOW (ref 13–44)
MCHC RBC-ENTMCNC: 31.8 PG — SIGNIFICANT CHANGE UP (ref 27–34)
MCHC RBC-ENTMCNC: 32.8 GM/DL — SIGNIFICANT CHANGE UP (ref 32–36)
MCV RBC AUTO: 97 FL — SIGNIFICANT CHANGE UP (ref 80–100)
MONOCYTES # BLD AUTO: 1.88 K/UL — HIGH (ref 0–0.9)
MONOCYTES NFR BLD AUTO: 8.6 % — SIGNIFICANT CHANGE UP (ref 2–14)
NEUTROPHILS # BLD AUTO: 19.29 K/UL — HIGH (ref 1.8–7.4)
NEUTROPHILS NFR BLD AUTO: 88.7 % — HIGH (ref 43–77)
NITRITE UR-MCNC: NEGATIVE — SIGNIFICANT CHANGE UP
NRBC # BLD: 0 /100 WBCS — SIGNIFICANT CHANGE UP (ref 0–0)
PH UR: 5 — SIGNIFICANT CHANGE UP (ref 5–8)
PLATELET # BLD AUTO: 299 K/UL — SIGNIFICANT CHANGE UP (ref 150–400)
POTASSIUM SERPL-MCNC: 4 MMOL/L — SIGNIFICANT CHANGE UP (ref 3.5–5.3)
POTASSIUM SERPL-SCNC: 4 MMOL/L — SIGNIFICANT CHANGE UP (ref 3.5–5.3)
PROT SERPL-MCNC: 8.1 G/DL — SIGNIFICANT CHANGE UP (ref 6–8.3)
PROT UR-MCNC: 100
PROTHROM AB SERPL-ACNC: 12.9 SEC — SIGNIFICANT CHANGE UP (ref 10.6–13.6)
RBC # BLD: 4.93 M/UL — SIGNIFICANT CHANGE UP (ref 4.2–5.8)
RBC # FLD: 13.6 % — SIGNIFICANT CHANGE UP (ref 10.3–14.5)
SARS-COV-2 RNA SPEC QL NAA+PROBE: SIGNIFICANT CHANGE UP
SODIUM SERPL-SCNC: 137 MMOL/L — SIGNIFICANT CHANGE UP (ref 135–145)
SP GR SPEC: 1.02 — SIGNIFICANT CHANGE UP (ref 1.01–1.02)
TROPONIN I SERPL-MCNC: <0.015 NG/ML — SIGNIFICANT CHANGE UP (ref 0–0.04)
UROBILINOGEN FLD QL: NEGATIVE — SIGNIFICANT CHANGE UP
WBC # BLD: 21.77 K/UL — HIGH (ref 3.8–10.5)
WBC # FLD AUTO: 21.77 K/UL — HIGH (ref 3.8–10.5)

## 2020-09-08 PROCEDURE — 76705 ECHO EXAM OF ABDOMEN: CPT | Mod: 26

## 2020-09-08 PROCEDURE — 47562 LAPAROSCOPIC CHOLECYSTECTOMY: CPT | Mod: AS

## 2020-09-08 PROCEDURE — 99223 1ST HOSP IP/OBS HIGH 75: CPT | Mod: 57

## 2020-09-08 PROCEDURE — 99285 EMERGENCY DEPT VISIT HI MDM: CPT

## 2020-09-08 PROCEDURE — 74177 CT ABD & PELVIS W/CONTRAST: CPT | Mod: 26

## 2020-09-08 PROCEDURE — 47562 LAPAROSCOPIC CHOLECYSTECTOMY: CPT

## 2020-09-08 PROCEDURE — 71045 X-RAY EXAM CHEST 1 VIEW: CPT | Mod: 26

## 2020-09-08 RX ORDER — ONDANSETRON 8 MG/1
4 TABLET, FILM COATED ORAL EVERY 6 HOURS
Refills: 0 | Status: DISCONTINUED | OUTPATIENT
Start: 2020-09-08 | End: 2020-09-08

## 2020-09-08 RX ORDER — HEPARIN SODIUM 5000 [USP'U]/ML
5000 INJECTION INTRAVENOUS; SUBCUTANEOUS EVERY 8 HOURS
Refills: 0 | Status: DISCONTINUED | OUTPATIENT
Start: 2020-09-08 | End: 2020-09-08

## 2020-09-08 RX ORDER — MORPHINE SULFATE 50 MG/1
4 CAPSULE, EXTENDED RELEASE ORAL ONCE
Refills: 0 | Status: DISCONTINUED | OUTPATIENT
Start: 2020-09-08 | End: 2020-09-08

## 2020-09-08 RX ORDER — KETOROLAC TROMETHAMINE 30 MG/ML
15 SYRINGE (ML) INJECTION EVERY 6 HOURS
Refills: 0 | Status: DISCONTINUED | OUTPATIENT
Start: 2020-09-08 | End: 2020-09-08

## 2020-09-08 RX ORDER — METRONIDAZOLE 500 MG
500 TABLET ORAL EVERY 8 HOURS
Refills: 0 | Status: DISCONTINUED | OUTPATIENT
Start: 2020-09-08 | End: 2020-09-08

## 2020-09-08 RX ORDER — SODIUM CHLORIDE 9 MG/ML
1000 INJECTION, SOLUTION INTRAVENOUS
Refills: 0 | Status: DISCONTINUED | OUTPATIENT
Start: 2020-09-08 | End: 2020-09-08

## 2020-09-08 RX ORDER — SODIUM CHLORIDE 9 MG/ML
1000 INJECTION INTRAMUSCULAR; INTRAVENOUS; SUBCUTANEOUS ONCE
Refills: 0 | Status: COMPLETED | OUTPATIENT
Start: 2020-09-08 | End: 2020-09-08

## 2020-09-08 RX ORDER — ACETAMINOPHEN 500 MG
650 TABLET ORAL EVERY 6 HOURS
Refills: 0 | Status: DISCONTINUED | OUTPATIENT
Start: 2020-09-08 | End: 2020-09-08

## 2020-09-08 RX ORDER — ONDANSETRON 8 MG/1
4 TABLET, FILM COATED ORAL ONCE
Refills: 0 | Status: COMPLETED | OUTPATIENT
Start: 2020-09-08 | End: 2020-09-08

## 2020-09-08 RX ORDER — PIPERACILLIN AND TAZOBACTAM 4; .5 G/20ML; G/20ML
3.38 INJECTION, POWDER, LYOPHILIZED, FOR SOLUTION INTRAVENOUS ONCE
Refills: 0 | Status: COMPLETED | OUTPATIENT
Start: 2020-09-08 | End: 2020-09-08

## 2020-09-08 RX ORDER — HYDROMORPHONE HYDROCHLORIDE 2 MG/ML
0.5 INJECTION INTRAMUSCULAR; INTRAVENOUS; SUBCUTANEOUS EVERY 4 HOURS
Refills: 0 | Status: DISCONTINUED | OUTPATIENT
Start: 2020-09-08 | End: 2020-09-08

## 2020-09-08 RX ORDER — BUDESONIDE AND FORMOTEROL FUMARATE DIHYDRATE 160; 4.5 UG/1; UG/1
2 AEROSOL RESPIRATORY (INHALATION)
Refills: 0 | Status: DISCONTINUED | OUTPATIENT
Start: 2020-09-08 | End: 2020-09-08

## 2020-09-08 RX ORDER — CIPROFLOXACIN LACTATE 400MG/40ML
400 VIAL (ML) INTRAVENOUS EVERY 12 HOURS
Refills: 0 | Status: DISCONTINUED | OUTPATIENT
Start: 2020-09-08 | End: 2020-09-08

## 2020-09-08 RX ADMIN — Medication 650 MILLIGRAM(S): at 16:40

## 2020-09-08 RX ADMIN — BUDESONIDE AND FORMOTEROL FUMARATE DIHYDRATE 2 PUFF(S): 160; 4.5 AEROSOL RESPIRATORY (INHALATION) at 20:21

## 2020-09-08 RX ADMIN — Medication 650 MILLIGRAM(S): at 18:01

## 2020-09-08 RX ADMIN — MORPHINE SULFATE 4 MILLIGRAM(S): 50 CAPSULE, EXTENDED RELEASE ORAL at 11:17

## 2020-09-08 RX ADMIN — PIPERACILLIN AND TAZOBACTAM 200 GRAM(S): 4; .5 INJECTION, POWDER, LYOPHILIZED, FOR SOLUTION INTRAVENOUS at 12:10

## 2020-09-08 RX ADMIN — SODIUM CHLORIDE 1000 MILLILITER(S): 9 INJECTION INTRAMUSCULAR; INTRAVENOUS; SUBCUTANEOUS at 11:17

## 2020-09-08 RX ADMIN — SODIUM CHLORIDE 100 MILLILITER(S): 9 INJECTION, SOLUTION INTRAVENOUS at 19:09

## 2020-09-08 RX ADMIN — MORPHINE SULFATE 4 MILLIGRAM(S): 50 CAPSULE, EXTENDED RELEASE ORAL at 11:40

## 2020-09-08 RX ADMIN — ONDANSETRON 4 MILLIGRAM(S): 8 TABLET, FILM COATED ORAL at 11:17

## 2020-09-08 RX ADMIN — Medication 100 MILLIGRAM(S): at 22:40

## 2020-09-08 RX ADMIN — Medication 200 MILLIGRAM(S): at 18:36

## 2020-09-08 NOTE — H&P ADULT - HISTORY OF PRESENT ILLNESS
74 yoM with PMH of COPD, on symbicort, 2 ppd x60yrs smoking history quit 2 yrs ago, no PSH; presents to ED with 3 days history of abdominal pain associated with emesis x2 this AM. Denies any changes in bowel habits. Pain is on epigastric region to RUQ. Denies prior episodes similar to this or any prior diagnosis of gallbladder disease. Denies fever/chills. No PO intake since yesterday, no allergies.

## 2020-09-08 NOTE — PATIENT PROFILE ADULT - NSPROPTRIGHTBILLOFRIGHTS_GEN_A_NUR
At the pharmacy get a tetanus-diptheria shot and Get SHINGRIX from your pharmacy NOW and repeat in 2-6 months.       Get us a copy of your Advanced Directives      Medicare Wellness Visit  Plan for Preventive Care    A good way for you to stay healthy is to use preventive care.  Medicare covers many services that can help you stay healthy.* The goal of these services is to find any health problems as quickly as possible. Finding problems early can help make them easier to treat.  Your personal plan below lists the services you may need and when they are due.     Health Maintenance Summary     Topic Due On Due Status Completed On Postpone Until Reason    Colorectal Cancer Screening - Colonoscopy Aug 21, 2027 Not Due Aug 21, 2017      Medicare Wellness Visit Aug 15, 2019 Not Due Aug 15, 2018      IMMUNIZATION - DTaP/Tdap/Td Feb 19, 2018 Postponed Feb 19, 2008 Aug 16, 2018 Patient Refused    Immunization-Influenza Sep 1, 2018 Not Due Nov 19, 2015      Hepatitis C Screening Apr 5, 1999 Postponed  Aug 16, 2018 Patient Refused      Completed Jun 27, 2017             Preventive Care for Women and Men    Heart Screenings (Cardiovascular):  · Blood tests are used to check your cholesterol, lipid and triglyceride levels. High levels can increase your risk for heart disease and stroke. High levels can be treated with medications, diet and exercise. Lowering your levels can help keep your heart and blood vessels healthy.  Your provider will order these tests if they are needed.    · An ultrasound is done to see if you have an abdominal aortic aneurysm (AAA).  This is an enlargement of one of the main blood vessels that delivers blood to the body.   In the [place], 9,000 deaths are caused by AAA.  You may not even know you have this problem and as many as 1 in 3 people will have a serious problem if it is not treated.  Early diagnosis allows for more effective treatment and cure.  If you have a family history of AAA or are  a male age 65-75 who has smoked, you are at higher risk of an AAA.  Your provider can order this test, if needed.    Colorectal Screening:  · There are many tests that are used to check for cancer of your colon and rectum. You and your provider should discuss what test is best for you and when to have it done.  Options include:  · Screening Colonoscopy: exam of the entire colon, seen through a flexible lighted tube.  · Flexible Sigmoidoscopy: exam of the last third (sigmoid portion) of the colon and rectum, seen through a flexible lighted tube.  · Cologuard DNA stool test: a sample of your stool is used to screen for cancer and unseen blood in your stool.  · Fecal Occult Blood Test: a sample of your stool is studied to find any unseen blood    Flu Shot:  · An immunization that helps to prevent influenza (the flu). You should get this every year. The best time to get the shot is in the fall.    Pneumococcal Shot:  • Vaccines are available that can help prevent pneumococcal disease, which is any type of infection caused by Streptococcus pneumoniae bacteria.   Their use can prevent some cases of pneumonia, meningitis, and sepsis. There are two types of pneumococcal vaccines:   o Conjugate vaccines (PCV-13 or Prevnar 13®) - helps protect against the 13 types of pneumococcal bacteria that are the most common causes of serious infections in children and adults.    o Polysaccharide vaccine (PPSV23 or Glvjdlvxe44®) - helps protect against 23 types of pneumococcal bacteria for patients who are recommended to get it.  These vaccines should be given at least 12 months apart.  A booster is usually not needed.     Hepatitis B Shot:  · An immunization that helps to protect people from getting Hepatitis B. Hepatitis B is a virus that spreads through contact with infected blood or body fluids. Many people with the virus do not have symptoms.  The virus can lead to serious problems, such as liver disease. Some people are at higher  risk than others. Your doctor will tell you if you need this shot.     Diabetes Screening:  · A test to measure sugar (glucose) in your blood is called a fasting blood sugar. Fasting means you cannot have food or drink for at least 8 hours before the test. This test can detect diabetes long before you may notice symptoms.    Glaucoma Screening:  · Glaucoma screening is performed by your eye doctor. The test measures the fluid pressure inside your eyes to determine if you have glaucoma.     Hepatitis C Screening:  · A blood test to see if you have the hepatitis C virus.  Hepatitis C attacks the liver and is a major cause of chronic liver disease.  Medicare will cover a single screening for all adults born between 1945 & 1965, or high risk patients (people who have injected illegal drugs or people who have had blood transfusions).  High risk patients who continue to inject illegal drugs can be screened for Hepatitis C every year.    Smoking and Tobacco-Use Cessation Counseling:  · Tobacco is the single greatest cause of disease and early death in our country today. Medication and counseling together can increase a person’s chance of quitting for good.   · Medicare covers two quitting attempts per year, with four counseling sessions per attempt (eight sessions in a 12 month period)    Preventive Screening tests for Women    Screening Mammograms and Breast Exams:  · An x-ray of your breasts to check for breast cancer before you or your doctor may be able to feel it.  If breast cancer is found early it can usually be treated with success.    Pelvic Exams and Pap Tests:  · An exam to check for cervical and vaginal cancer. A Pap test is a lab test in which cells are taken from your cervix and sent to the lab to look for signs of cervical cancer. If cancer of the cervix is found early, chances for a cure are good. Testing can generally end at age 65, or if a woman has a hysterectomy for a benign condition. Your provider may  recommend more frequent testing if certain abnormal results are found.    Bone Mass Measurements:  · A painless x-ray of your bone density to see if you are at risk for a broken bone. Bone density refers to the thickness of bones or how tightly the bone tissue is packed.    Preventive Screening tests for Men    Prostate Screening:  · PSA - Prostate Cancer blood test.  Experts do not recommend routine screening of healthy men with no signs or symptoms of prostate disease.  However, men should not ignore urinary symptoms, and should discuss their family history with their doctor.    *Medicare pays for many preventive services to keep you healthy. For some of these services, you might have to pay a deductible, coinsurance, and / or copayment.  The amounts vary depending on the type of services you need and the kind of Medicare health plan you have.        At the pharmacy get a tetanus-diptheria shot and Get SHINGRIX from your pharmacy NOW and repeat in 2-6 months.     Get us a copy of your Advanced Directives       patient

## 2020-09-08 NOTE — H&P ADULT - ATTENDING COMMENTS
75 yo man with acute cholecystitis and sepsis present on admission.   Risk benefits and alternatives discussed with the patient and his family and informed consent was signed.     Plan for emergent laparoscopic cholecystectomy tonight as soon as OR is available.

## 2020-09-08 NOTE — ED PROVIDER NOTE - CLINICAL SUMMARY MEDICAL DECISION MAKING FREE TEXT BOX
No CP/SOB or murmur or pulse asymmetry and low suspicion for dissection. Character and exam low suspicion for AAA or mesenteric ischemia. Exam c/f cholecystitis No CP/SOB or murmur or pulse asymmetry and low suspicion for dissection. Character and exam low suspicion for AAA or mesenteric ischemia. Exam c/f cholecystitis, confirmed on US. No CP/SOB or murmur or pulse asymmetry and low suspicion for dissection. Character and exam low suspicion for AAA or mesenteric ischemia. Exam c/f cholecystitis, confirmed on US. Patient well appearing, hemodynamically stable. Given Zosyn. Admitted to surgery for further monitoring, w/u, and care. No CP/SOB or murmur or pulse asymmetry and low suspicion for dissection. Character and exam low suspicion for AAA or mesenteric ischemia. Exam c/f cholecystitis, confirmed on US. Patient well appearing, hemodynamically stable. Given Zosyn. Admitted to surgery for further monitoring, w/u, and care. D/w MILAD Leon of surgery and she will f/u CT performed.

## 2020-09-08 NOTE — PATIENT PROFILE ADULT - SURGICAL SITE DRAINAGE DESCRIPTION
PLASTICS    His is a 49year old female that is POD #1 S/P her bilateral mastectomy (Dr. Vita Leon) and immediate reconstruction with tissue expanders, pre-pec placement, with ADM and 480cc intra-op air fill.  She had a genetic predisposition for breast cancer call her to schedule a time  8.  Call Sloane Glez PA-C with any questions [phone] or in the OR with Dr. Millan Pac today all day ARIE x 1

## 2020-09-08 NOTE — H&P ADULT - ASSESSMENT
74 yoM with acute calculous cholecystitis    leukocytosis with wbc of 21, LFT/T bili wnl  US with GS cholecystitis    - admit to surgery  - preop mode for lap anne  - EKG without acute abnormality, CXR pending  - NPO, IVF  - pain control as needed  - DVT/GI ppx  - d/w attending 74 yoM with acute calculous cholecystitis    leukocytosis with wbc of 21, LFT/T bili wnl  US with GS cholecystitis    - admit to surgery  - preop mode for lap anne  - EKG without acute abnormality, CXR pending  - NPO, IVF; abx  - pain control as needed  - DVT/GI ppx  - d/w attending

## 2020-09-08 NOTE — ED PROVIDER NOTE - OBJECTIVE STATEMENT
74yoM with h/o COPD only, on symbicort alone, no surgical hx, presents with 8/10 nonradiating abdominal "discomfort"/pressure, worsening since Sunday, generalized though especially in epigastric region, worsened with movement. Associated bilious emesis x 1 today. Having loose stool for the past few days. Denies CP, SOB, fever, dys/hematuria or change to urinary flow, feeling of constipation, hematochezia, and all other symptoms.

## 2020-09-08 NOTE — H&P ADULT - NSHPLABSRESULTS_GEN_ALL_CORE
Vital Signs Last 24 Hrs  T(C): 36.6 (08 Sep 2020 09:42), Max: 36.6 (08 Sep 2020 09:42)  T(F): 97.9 (08 Sep 2020 09:42), Max: 97.9 (08 Sep 2020 09:42)  HR: 66 (08 Sep 2020 09:42) (66 - 66)  BP: 180/67 (08 Sep 2020 09:42) (180/67 - 180/67)  BP(mean): --  RR: 18 (08 Sep 2020 09:42) (18 - 18)  SpO2: 96% (08 Sep 2020 09:42) (96% - 96%)                          15.7   21.77 )-----------( 299      ( 08 Sep 2020 11:30 )             47.8     09-08    137  |  101  |  12  ----------------------------<  120<H>  4.0   |  33<H>  |  0.96    Ca    9.0      08 Sep 2020 11:30    TPro  8.1  /  Alb  3.6  /  TBili  0.8  /  DBili  x   /  AST  17  /  ALT  21  /  AlkPhos  102  09-08      < from: US Hepatic & Pancreatic (09.08.20 @ 11:00) >      INTERPRETATION:  Right upper quadrant pain.  Right upper quadrant ultrasound.  A shadowing gallstone is noted. Gallbladder wall is diffusely thickened. Thesonographer reports a positive ultrasonographic Torres sign. Constellation of findings are concerning for acute calculus cholecystitis. Liver appears unremarkable. Common duct dilated up to 0.8 cm possibly age-related. Correlate with LFTs and MRCP ifwarranted to exclude occult distal common duct obstructing calculus. No intrahepatic biliary dilatation. Pancreas right kidney unremarkable. Right kidney 11.6 cm.  No ascites.    Impression:    Constellation of findings concerning for acute calculus cholecystitis.  Common duct dilatation. Correlate with LFTs MRCP as warranted      < end of copied text >

## 2020-09-08 NOTE — ED ADULT NURSE NOTE - OBJECTIVE STATEMENT
Pt AOx4, ambulatory, c/o abdominal pain, and bloating since saturday. PT denies hematuria, dysuria, problem with BM. Pt denies recent travel, CP, SOB, cough, and/or sick contacts. No distress noted.

## 2020-09-08 NOTE — ED PROVIDER NOTE - PHYSICAL EXAMINATION
Afebrile, hemodynamically stable, saturating well  NAD, well appearing  Head NCAT  EOMI grossly, anicteric  MMM  RRR, nml S1/S2, no m/r/g  Lungs CTAB, no w/r/r  Abd soft, mild distension, TTP diffusely with +guarding, especially RUQ and epigastrium, decreased BS, +Torres's, no CVAT  AAO, CN's 3-12 grossly intact  DAIGLE spontaneously, no leg cyanosis or edema  Skin warm, dry, no rashes or hives

## 2020-09-08 NOTE — H&P ADULT - NSHPPHYSICALEXAM_GEN_ALL_CORE
General: AOx3, NAD  Resp: breathing unlabored  Heart: regular rate and rhythm  Abdomen: marked tenderness RUQ, nonperitonitic, soft, nondistended  Ext: no pedal edema

## 2020-09-09 ENCOUNTER — RESULT REVIEW (OUTPATIENT)
Age: 75
End: 2020-09-09

## 2020-09-09 LAB
ANION GAP SERPL CALC-SCNC: 3 MMOL/L — LOW (ref 5–17)
BUN SERPL-MCNC: 10 MG/DL — SIGNIFICANT CHANGE UP (ref 7–18)
CALCIUM SERPL-MCNC: 8.1 MG/DL — LOW (ref 8.4–10.5)
CHLORIDE SERPL-SCNC: 105 MMOL/L — SIGNIFICANT CHANGE UP (ref 96–108)
CO2 SERPL-SCNC: 32 MMOL/L — HIGH (ref 22–31)
CREAT SERPL-MCNC: 0.91 MG/DL — SIGNIFICANT CHANGE UP (ref 0.5–1.3)
CULTURE RESULTS: SIGNIFICANT CHANGE UP
GLUCOSE SERPL-MCNC: 137 MG/DL — HIGH (ref 70–99)
HCT VFR BLD CALC: 41.2 % — SIGNIFICANT CHANGE UP (ref 39–50)
HCV AB S/CO SERPL IA: 0.05 S/CO — SIGNIFICANT CHANGE UP (ref 0–0.99)
HCV AB SERPL-IMP: SIGNIFICANT CHANGE UP
HGB BLD-MCNC: 13.9 G/DL — SIGNIFICANT CHANGE UP (ref 13–17)
MCHC RBC-ENTMCNC: 32.6 PG — SIGNIFICANT CHANGE UP (ref 27–34)
MCHC RBC-ENTMCNC: 33.7 GM/DL — SIGNIFICANT CHANGE UP (ref 32–36)
MCV RBC AUTO: 96.5 FL — SIGNIFICANT CHANGE UP (ref 80–100)
NRBC # BLD: 0 /100 WBCS — SIGNIFICANT CHANGE UP (ref 0–0)
PLATELET # BLD AUTO: 250 K/UL — SIGNIFICANT CHANGE UP (ref 150–400)
POTASSIUM SERPL-MCNC: 3.9 MMOL/L — SIGNIFICANT CHANGE UP (ref 3.5–5.3)
POTASSIUM SERPL-SCNC: 3.9 MMOL/L — SIGNIFICANT CHANGE UP (ref 3.5–5.3)
RBC # BLD: 4.27 M/UL — SIGNIFICANT CHANGE UP (ref 4.2–5.8)
RBC # FLD: 13.8 % — SIGNIFICANT CHANGE UP (ref 10.3–14.5)
SARS-COV-2 IGG SERPL QL IA: NEGATIVE — SIGNIFICANT CHANGE UP
SARS-COV-2 IGM SERPL IA-ACNC: <3.8 AU/ML — SIGNIFICANT CHANGE UP
SODIUM SERPL-SCNC: 140 MMOL/L — SIGNIFICANT CHANGE UP (ref 135–145)
SPECIMEN SOURCE: SIGNIFICANT CHANGE UP
WBC # BLD: 18.18 K/UL — HIGH (ref 3.8–10.5)
WBC # FLD AUTO: 18.18 K/UL — HIGH (ref 3.8–10.5)

## 2020-09-09 PROCEDURE — 88304 TISSUE EXAM BY PATHOLOGIST: CPT | Mod: 26

## 2020-09-09 RX ORDER — ONDANSETRON 8 MG/1
4 TABLET, FILM COATED ORAL ONCE
Refills: 0 | Status: DISCONTINUED | OUTPATIENT
Start: 2020-09-09 | End: 2020-09-09

## 2020-09-09 RX ORDER — ONDANSETRON 8 MG/1
4 TABLET, FILM COATED ORAL EVERY 6 HOURS
Refills: 0 | Status: DISCONTINUED | OUTPATIENT
Start: 2020-09-09 | End: 2020-09-10

## 2020-09-09 RX ORDER — METRONIDAZOLE 500 MG
500 TABLET ORAL EVERY 8 HOURS
Refills: 0 | Status: COMPLETED | OUTPATIENT
Start: 2020-09-09 | End: 2020-09-09

## 2020-09-09 RX ORDER — GABAPENTIN 400 MG/1
100 CAPSULE ORAL EVERY 12 HOURS
Refills: 0 | Status: DISCONTINUED | OUTPATIENT
Start: 2020-09-09 | End: 2020-09-10

## 2020-09-09 RX ORDER — SODIUM CHLORIDE 9 MG/ML
1000 INJECTION, SOLUTION INTRAVENOUS
Refills: 0 | Status: DISCONTINUED | OUTPATIENT
Start: 2020-09-09 | End: 2020-09-09

## 2020-09-09 RX ORDER — BUDESONIDE AND FORMOTEROL FUMARATE DIHYDRATE 160; 4.5 UG/1; UG/1
2 AEROSOL RESPIRATORY (INHALATION)
Refills: 0 | Status: DISCONTINUED | OUTPATIENT
Start: 2020-09-09 | End: 2020-09-10

## 2020-09-09 RX ORDER — HYDROMORPHONE HYDROCHLORIDE 2 MG/ML
0.5 INJECTION INTRAMUSCULAR; INTRAVENOUS; SUBCUTANEOUS
Refills: 0 | Status: DISCONTINUED | OUTPATIENT
Start: 2020-09-09 | End: 2020-09-09

## 2020-09-09 RX ORDER — CIPROFLOXACIN LACTATE 400MG/40ML
400 VIAL (ML) INTRAVENOUS EVERY 12 HOURS
Refills: 0 | Status: COMPLETED | OUTPATIENT
Start: 2020-09-09 | End: 2020-09-09

## 2020-09-09 RX ORDER — HEPARIN SODIUM 5000 [USP'U]/ML
5000 INJECTION INTRAVENOUS; SUBCUTANEOUS EVERY 8 HOURS
Refills: 0 | Status: DISCONTINUED | OUTPATIENT
Start: 2020-09-09 | End: 2020-09-10

## 2020-09-09 RX ORDER — ACETAMINOPHEN 500 MG
650 TABLET ORAL EVERY 6 HOURS
Refills: 0 | Status: DISCONTINUED | OUTPATIENT
Start: 2020-09-09 | End: 2020-09-10

## 2020-09-09 RX ORDER — OXYCODONE AND ACETAMINOPHEN 5; 325 MG/1; MG/1
1 TABLET ORAL EVERY 4 HOURS
Refills: 0 | Status: DISCONTINUED | OUTPATIENT
Start: 2020-09-09 | End: 2020-09-10

## 2020-09-09 RX ORDER — MORPHINE SULFATE 50 MG/1
2 CAPSULE, EXTENDED RELEASE ORAL EVERY 4 HOURS
Refills: 0 | Status: DISCONTINUED | OUTPATIENT
Start: 2020-09-09 | End: 2020-09-10

## 2020-09-09 RX ADMIN — BUDESONIDE AND FORMOTEROL FUMARATE DIHYDRATE 2 PUFF(S): 160; 4.5 AEROSOL RESPIRATORY (INHALATION) at 21:27

## 2020-09-09 RX ADMIN — GABAPENTIN 100 MILLIGRAM(S): 400 CAPSULE ORAL at 17:02

## 2020-09-09 RX ADMIN — Medication 200 MILLIGRAM(S): at 06:46

## 2020-09-09 RX ADMIN — HEPARIN SODIUM 5000 UNIT(S): 5000 INJECTION INTRAVENOUS; SUBCUTANEOUS at 06:47

## 2020-09-09 RX ADMIN — OXYCODONE AND ACETAMINOPHEN 1 TABLET(S): 5; 325 TABLET ORAL at 21:23

## 2020-09-09 RX ADMIN — Medication 200 MILLIGRAM(S): at 17:03

## 2020-09-09 RX ADMIN — Medication 100 MILLIGRAM(S): at 13:09

## 2020-09-09 RX ADMIN — GABAPENTIN 100 MILLIGRAM(S): 400 CAPSULE ORAL at 06:47

## 2020-09-09 RX ADMIN — HEPARIN SODIUM 5000 UNIT(S): 5000 INJECTION INTRAVENOUS; SUBCUTANEOUS at 21:23

## 2020-09-09 RX ADMIN — HEPARIN SODIUM 5000 UNIT(S): 5000 INJECTION INTRAVENOUS; SUBCUTANEOUS at 13:09

## 2020-09-09 RX ADMIN — BUDESONIDE AND FORMOTEROL FUMARATE DIHYDRATE 2 PUFF(S): 160; 4.5 AEROSOL RESPIRATORY (INHALATION) at 10:20

## 2020-09-09 RX ADMIN — OXYCODONE AND ACETAMINOPHEN 1 TABLET(S): 5; 325 TABLET ORAL at 22:17

## 2020-09-09 RX ADMIN — Medication 100 MILLIGRAM(S): at 06:46

## 2020-09-09 NOTE — PROGRESS NOTE ADULT - ASSESSMENT
74M s/p laparoscopic gangrenous cholecystectomy 9/9 at 1am, POD#0, stable  leukocytosis downtrending    - cont ARIE care, monitor output  - cont IV abx  - cont reg diet  - prn pain control  - dvt ppx/oob/ambulation

## 2020-09-09 NOTE — PROGRESS NOTE ADULT - SUBJECTIVE AND OBJECTIVE BOX
INTERVAL HPI/OVERNIGHT EVENTS:  No acute events overnight. Pt resting comfortably, reports incisional pain. Denies N/V.    MEDICATIONS  (STANDING):  budesonide 160 MICROgram(s)/formoterol 4.5 MICROgram(s) Inhaler 2 Puff(s) Inhalation two times a day  ciprofloxacin   IVPB 400 milliGRAM(s) IV Intermittent every 12 hours  gabapentin 100 milliGRAM(s) Oral every 12 hours  heparin   Injectable 5000 Unit(s) SubCutaneous every 8 hours  lactated ringers. 1000 milliLiter(s) (50 mL/Hr) IV Continuous <Continuous>  metroNIDAZOLE  IVPB 500 milliGRAM(s) IV Intermittent every 8 hours    MEDICATIONS  (PRN):  acetaminophen   Tablet .. 650 milliGRAM(s) Oral every 6 hours PRN Temp greater or equal to 38C (100.4F)  morphine  - Injectable 2 milliGRAM(s) IV Push every 4 hours PRN Severe Pain (7 - 10)  ondansetron Injectable 4 milliGRAM(s) IV Push every 6 hours PRN Nausea  oxycodone    5 mG/acetaminophen 325 mG 1 Tablet(s) Oral every 4 hours PRN Moderate Pain (4 - 6)      Vital Signs Last 24 Hrs  T(C): 37.4 (09 Sep 2020 05:11), Max: 38.2 (08 Sep 2020 16:18)  T(F): 99.3 (09 Sep 2020 05:11), Max: 100.8 (08 Sep 2020 16:18)  HR: 79 (09 Sep 2020 05:11) (66 - 100)  BP: 135/55 (09 Sep 2020 05:11) (124/57 - 180/67)  BP(mean): 72 (09 Sep 2020 02:28) (72 - 96)  RR: 18 (09 Sep 2020 05:11) (14 - 18)  SpO2: 98% (09 Sep 2020 05:11) (87% - 100%)    Physical:  General: Alert and oriented, not in acute distress  Resp: Breathing labored with movement (COPD pmhx)  Abdomen: Soft, distended, luis-incisional tenderness; dressings c/d/i, ARIE with sero>sanguinous  : No shepherd catheter, no dysuria or hematuria  Extremities: No pedal edema    I&O's Detail  08 Sep 2020 07:01  -  09 Sep 2020 07:00  --------------------------------------------------------  IN:    IV PiggyBack: 300 mL    Lactated Ringers IV Bolus: 1700 mL    lactated ringers.: 300 mL  Total IN: 2300 mL    OUT:    Bulb: 80 mL  Total OUT: 80 mL  Total NET: 2220 mL    LABS:                      13.9   18.18 )-----------( 250      ( 09 Sep 2020 07:28 )             41.2             09-09    140  |  105  |  10  ----------------------------<  137<H>  3.9   |  32<H>  |  0.91    Ca    8.1<L>      09 Sep 2020 07:28    TPro  8.1  /  Alb  3.6  /  TBili  0.8  /  DBili  x   /  AST  17  /  ALT  21  /  AlkPhos  102  09-08

## 2020-09-09 NOTE — MEDICAL STUDENT PROGRESS NOTE(EDUCATION) - NS MD HP STUD ASPLAN PLAN FT
-monitor I&Os  -Pain management PRN  -DVT ppx w/ heparin + OOB  -advance diet  -continue antibiotics

## 2020-09-09 NOTE — MEDICAL STUDENT PROGRESS NOTE(EDUCATION) - SUBJECTIVE AND OBJECTIVE BOX
INTERVAL HPI/OVERNIGHT EVENTS:    Pt resting comfortably, reporting significantly improved abd pain s/p lap anne. Pt NPO almost 48 hours and has not had BM, flatus, or urinated since surgery. Pt denies CP, SOB, fever, nausea, vomiting.      MEDICATIONS (STANDING):  budesonide 160 MICROgram(s)/formoterol 4.5 MICROgram(s) Inhaler 2 Puff(s) Inhalation two times a day  ciprofloxacin   IVPB 400 milliGRAM(s) IV Intermittent every 12 hours  gabapentin 100 milliGRAM(s) Oral every 12 hours  heparin   Injectable 5000 Unit(s) SubCutaneous every 8 hours    MEDICATIONS  (PRN):  acetaminophen   Tablet .. 650 milliGRAM(s) Oral every 6 hours PRN  morphine  - Injectable 2 milliGRAM(s) IV Push every 4 hours PRN  ondansetron Injectable 4 milliGRAM(s) IV Push every 6 hours PRN  oxycodone    5 mG/acetaminophen 325 mG 1 Tablet(s) Oral every 4 hours PRN      REVIEW OF SYSTEMS:  CONSTITUTIONAL: No fever  RESPIRATORY: No shortness of breath  CARDIOVASCULAR: No chest pain  GASTROINTESTINAL: No diarrhea or constipation.  NEUROLOGICAL: No headaches    T(F): 98.6 (20 @ 14:25), Max: 100.8 (20 @ 16:18)  HR: 92 (20 @ 14:25) (79 - 100)  BP: 114/60 (20 @ 14:25) (114/60 - 173/69)  RR: 18 (20 @ 14:25) (14 - 18)  SpO2: 95% (20 @ 14:25) (87% - 100%)  Wt(kg): --  CAPILLARY BLOOD GLUCOSE        I&O's Summary    08 Sep 2020 07:  -  09 Sep 2020 07:00  --------------------------------------------------------  IN: 2300 mL / OUT: 80 mL / NET: 2220 mL    09 Sep 2020 07  -  09 Sep 2020 14:53  --------------------------------------------------------  IN: 0 mL / OUT: 75 mL / NET: -75 mL        PHYSICAL EXAM:  GENERAL: NAD  NERVOUS SYSTEM:  Alert & Oriented X3  CHEST/LUNG: Clear to auscultation bilaterally  HEART: Regular rate and rhythm; No murmurs, rubs, or gallops  ABDOMEN: Soft, Nondistended; incisions clean, intact, and dry. drain contains serosanguinous fluid  EXTREMITIES: No edema      LABS:                        13.9   18.18 )-----------( 250      ( 09 Sep 2020 07:28 )             41.2     -    140  |  105  |  10  ----------------------------<  137<H>  3.9   |  32<H>  |  0.91    Ca    8.1<L>      09 Sep 2020 07:28    TPro  8.1  /  Alb  3.6  /  TBili  0.8  /  DBili  x   /  AST  17  /  ALT  21  /  AlkPhos  102  09-08    PT/INR - ( 08 Sep 2020 11:30 )   PT: 12.9 sec;   INR: 1.11 ratio         PTT - ( 08 Sep 2020 11:30 )  PTT:30.2 sec  Urinalysis Basic - ( 08 Sep 2020 10:54 )    Color: Yellow / Appearance: Slightly Turbid / S.025 / pH: x  Gluc: x / Ketone: Negative  / Bili: Negative / Urobili: Negative   Blood: x / Protein: 100 / Nitrite: Negative   Leuk Esterase: Negative / RBC: 0-2 /HPF / WBC 0-2 /HPF   Sq Epi: x / Non Sq Epi: Occasional /HPF / Bacteria: x          RADIOLOGY & ADDITIONAL TESTS:

## 2020-09-09 NOTE — PROGRESS NOTE ADULT - SUBJECTIVE AND OBJECTIVE BOX
POST-OPERATIVE NOTE    Subjective:   74y Male s/p lap cholecystectomy POD #0. Denies nausea, vomiting, chest pain, sob, fevers chills. Pain is well controlled. Ambulating independently. Not voided yet    Vital Signs Last 24 Hrs  T(C): 37.4 (09 Sep 2020 05:11), Max: 38.2 (08 Sep 2020 16:18)  T(F): 99.3 (09 Sep 2020 05:11), Max: 100.8 (08 Sep 2020 16:18)  HR: 79 (09 Sep 2020 05:11) (66 - 100)  BP: 135/55 (09 Sep 2020 05:11) (124/57 - 180/67)  BP(mean): 72 (09 Sep 2020 02:28) (72 - 96)  RR: 18 (09 Sep 2020 05:11) (14 - 18)  SpO2: 98% (09 Sep 2020 05:11) (87% - 100%)  I&O's Detail    08 Sep 2020 07:01  -  09 Sep 2020 06:07  --------------------------------------------------------  IN:    Lactated Ringers IV Bolus: 1700 mL    lactated ringers.: 150 mL  Total IN: 1850 mL    OUT:    Bulb: 30 mL  Total OUT: 30 mL    Total NET: 1820 mL          Physical Exam:  General: NAD, resting comfortably in bed  Pulmonary: Nonlabored breathing, no respiratory distress  Cardiovascular: NSR, S1, S2  Abdominal: soft, NT/ND, dressing c/d/i  Extremities: no edema, no calf tenderness, distal pulses are palpable     LABS:                        15.7   21.77 )-----------( 299      ( 08 Sep 2020 11:30 )             47.8     09-08    137  |  101  |  12  ----------------------------<  120<H>  4.0   |  33<H>  |  0.96    Ca    9.0      08 Sep 2020 11:30    TPro  8.1  /  Alb  3.6  /  TBili  0.8  /  DBili  x   /  AST  17  /  ALT  21  /  AlkPhos  102  09-08    LIVER FUNCTIONS - ( 08 Sep 2020 11:30 )  Alb: 3.6 g/dL / Pro: 8.1 g/dL / ALK PHOS: 102 U/L / ALT: 21 U/L DA / AST: 17 U/L / GGT: x             MEDICATIONS  (STANDING):  budesonide 160 MICROgram(s)/formoterol 4.5 MICROgram(s) Inhaler 2 Puff(s) Inhalation two times a day  ciprofloxacin   IVPB 400 milliGRAM(s) IV Intermittent every 12 hours  gabapentin 100 milliGRAM(s) Oral every 12 hours  heparin   Injectable 5000 Unit(s) SubCutaneous every 8 hours  lactated ringers. 1000 milliLiter(s) (50 mL/Hr) IV Continuous <Continuous>  metroNIDAZOLE  IVPB 500 milliGRAM(s) IV Intermittent every 8 hours    MEDICATIONS  (PRN):  acetaminophen   Tablet .. 650 milliGRAM(s) Oral every 6 hours PRN Temp greater or equal to 38C (100.4F)  morphine  - Injectable 2 milliGRAM(s) IV Push every 4 hours PRN Severe Pain (7 - 10)  ondansetron Injectable 4 milliGRAM(s) IV Push every 6 hours PRN Nausea  oxycodone    5 mG/acetaminophen 325 mG 1 Tablet(s) Oral every 4 hours PRN Moderate Pain (4 - 6)      Assessment:  The patient is a 74y Male who is s/p lap cholecystectomy POD #0. Stable    Plan:  -f/u Urine output  - Pain control as needed  -Dressing change prn   - OOB and ambulating as tolerated  - F/u AM labs  - DVT ppx

## 2020-09-10 ENCOUNTER — TRANSCRIPTION ENCOUNTER (OUTPATIENT)
Age: 75
End: 2020-09-10

## 2020-09-10 VITALS
OXYGEN SATURATION: 98 % | RESPIRATION RATE: 16 BRPM | TEMPERATURE: 98 F | HEART RATE: 77 BPM | SYSTOLIC BLOOD PRESSURE: 124 MMHG | DIASTOLIC BLOOD PRESSURE: 63 MMHG

## 2020-09-10 PROBLEM — J44.9 CHRONIC OBSTRUCTIVE PULMONARY DISEASE, UNSPECIFIED: Chronic | Status: ACTIVE | Noted: 2020-09-08

## 2020-09-10 LAB
HCT VFR BLD CALC: 44.9 % — SIGNIFICANT CHANGE UP (ref 39–50)
HGB BLD-MCNC: 14.4 G/DL — SIGNIFICANT CHANGE UP (ref 13–17)
MCHC RBC-ENTMCNC: 31.9 PG — SIGNIFICANT CHANGE UP (ref 27–34)
MCHC RBC-ENTMCNC: 32.1 GM/DL — SIGNIFICANT CHANGE UP (ref 32–36)
MCV RBC AUTO: 99.3 FL — SIGNIFICANT CHANGE UP (ref 80–100)
NRBC # BLD: 0 /100 WBCS — SIGNIFICANT CHANGE UP (ref 0–0)
PLATELET # BLD AUTO: 291 K/UL — SIGNIFICANT CHANGE UP (ref 150–400)
RBC # BLD: 4.52 M/UL — SIGNIFICANT CHANGE UP (ref 4.2–5.8)
RBC # FLD: 13.9 % — SIGNIFICANT CHANGE UP (ref 10.3–14.5)
WBC # BLD: 11.81 K/UL — HIGH (ref 3.8–10.5)
WBC # FLD AUTO: 11.81 K/UL — HIGH (ref 3.8–10.5)

## 2020-09-10 PROCEDURE — 87086 URINE CULTURE/COLONY COUNT: CPT

## 2020-09-10 PROCEDURE — 74177 CT ABD & PELVIS W/CONTRAST: CPT

## 2020-09-10 PROCEDURE — 93005 ELECTROCARDIOGRAM TRACING: CPT

## 2020-09-10 PROCEDURE — 85730 THROMBOPLASTIN TIME PARTIAL: CPT

## 2020-09-10 PROCEDURE — 86803 HEPATITIS C AB TEST: CPT

## 2020-09-10 PROCEDURE — 83605 ASSAY OF LACTIC ACID: CPT

## 2020-09-10 PROCEDURE — 80053 COMPREHEN METABOLIC PANEL: CPT

## 2020-09-10 PROCEDURE — 81001 URINALYSIS AUTO W/SCOPE: CPT

## 2020-09-10 PROCEDURE — 85027 COMPLETE CBC AUTOMATED: CPT

## 2020-09-10 PROCEDURE — 76705 ECHO EXAM OF ABDOMEN: CPT

## 2020-09-10 PROCEDURE — 86900 BLOOD TYPING SEROLOGIC ABO: CPT

## 2020-09-10 PROCEDURE — 99285 EMERGENCY DEPT VISIT HI MDM: CPT

## 2020-09-10 PROCEDURE — 88304 TISSUE EXAM BY PATHOLOGIST: CPT

## 2020-09-10 PROCEDURE — 85610 PROTHROMBIN TIME: CPT

## 2020-09-10 PROCEDURE — 36415 COLL VENOUS BLD VENIPUNCTURE: CPT

## 2020-09-10 PROCEDURE — 86923 COMPATIBILITY TEST ELECTRIC: CPT

## 2020-09-10 PROCEDURE — 87635 SARS-COV-2 COVID-19 AMP PRB: CPT

## 2020-09-10 PROCEDURE — C1889: CPT

## 2020-09-10 PROCEDURE — 94640 AIRWAY INHALATION TREATMENT: CPT

## 2020-09-10 PROCEDURE — 86850 RBC ANTIBODY SCREEN: CPT

## 2020-09-10 PROCEDURE — 71045 X-RAY EXAM CHEST 1 VIEW: CPT

## 2020-09-10 PROCEDURE — 84484 ASSAY OF TROPONIN QUANT: CPT

## 2020-09-10 PROCEDURE — 83690 ASSAY OF LIPASE: CPT

## 2020-09-10 PROCEDURE — 86769 SARS-COV-2 COVID-19 ANTIBODY: CPT

## 2020-09-10 PROCEDURE — 86901 BLOOD TYPING SEROLOGIC RH(D): CPT

## 2020-09-10 PROCEDURE — 96374 THER/PROPH/DIAG INJ IV PUSH: CPT

## 2020-09-10 PROCEDURE — 96375 TX/PRO/DX INJ NEW DRUG ADDON: CPT

## 2020-09-10 PROCEDURE — 80048 BASIC METABOLIC PNL TOTAL CA: CPT

## 2020-09-10 RX ADMIN — BUDESONIDE AND FORMOTEROL FUMARATE DIHYDRATE 2 PUFF(S): 160; 4.5 AEROSOL RESPIRATORY (INHALATION) at 10:30

## 2020-09-10 RX ADMIN — HEPARIN SODIUM 5000 UNIT(S): 5000 INJECTION INTRAVENOUS; SUBCUTANEOUS at 05:57

## 2020-09-10 RX ADMIN — GABAPENTIN 100 MILLIGRAM(S): 400 CAPSULE ORAL at 05:57

## 2020-09-10 NOTE — DISCHARGE NOTE NURSING/CASE MANAGEMENT/SOCIAL WORK - NSDCPEWEB_GEN_ALL_CORE
[facility] for Tobacco Control website --- [email or web address] --- [email or web address]

## 2020-09-10 NOTE — DISCHARGE NOTE PROVIDER - NSDCFUSCHEDAPPT_GEN_ALL_CORE_FT
DAYSI PIÑA ; 09/17/2020 ; [facility] [id number] DAYSI PIÑA ; 09/17/2020 ; [facility] [id number]

## 2020-09-10 NOTE — MEDICAL STUDENT PROGRESS NOTE(EDUCATION) - SUBJECTIVE AND OBJECTIVE BOX
INTERVAL HPI/OVERNIGHT EVENTS:    No overnight events. Pt reports being comfortable w/ no complaints other than incisional pain. Denies fever, chills, CP, SOB.     MEDICATIONS (STANDING):  budesonide 160 MICROgram(s)/formoterol 4.5 MICROgram(s) Inhaler 2 Puff(s) Inhalation two times a day  gabapentin 100 milliGRAM(s) Oral every 12 hours  heparin   Injectable 5000 Unit(s) SubCutaneous every 8 hours    MEDICATIONS  (PRN):  acetaminophen   Tablet .. 650 milliGRAM(s) Oral every 6 hours PRN  morphine  - Injectable 2 milliGRAM(s) IV Push every 4 hours PRN  ondansetron Injectable 4 milliGRAM(s) IV Push every 6 hours PRN  oxycodone    5 mG/acetaminophen 325 mG 1 Tablet(s) Oral every 4 hours PRN      REVIEW OF SYSTEMS:  CONSTITUTIONAL: No fever  RESPIRATORY: No shortness of breath  CARDIOVASCULAR: No chest pain, dizziness  GASTROINTESTINAL: No diarrhea or constipation.   GENITOURINARY: No dysuria      T(F): 98.2 (09-10-20 @ 11:28), Max: 99.3 (09-09-20 @ 21:35)  HR: 77 (09-10-20 @ 11:28) (69 - 93)  BP: 124/63 (09-10-20 @ 11:28) (124/63 - 132/49)  RR: 16 (09-10-20 @ 11:28) (16 - 17)  SpO2: 98% (09-10-20 @ 11:28) (95% - 98%)  Wt(kg): --  CAPILLARY BLOOD GLUCOSE        I&O's Summary    09 Sep 2020 07:01  -  10 Sep 2020 07:00  --------------------------------------------------------  IN: 0 mL / OUT: 160 mL / NET: -160 mL        PHYSICAL EXAM:  GENERAL: NAD  EYES: conjunctiva and sclera clear  NERVOUS SYSTEM:  Alert & Oriented X3  CHEST/LUNG: Clear to auscultation bilaterally  HEART: Regular rate and rhythm; No murmurs, rubs, or gallops  ABDOMEN: Soft, Nondistended; incisional tenderness. ARIE drain containing serosanguinous fluid  EXTREMITIES: No edema  SKIN: No rashes or lesions    LABS:                        14.4   11.81 )-----------( 291      ( 10 Sep 2020 06:01 )             44.9     09-09    140  |  105  |  10  ----------------------------<  137<H>  3.9   |  32<H>  |  0.91    Ca    8.1<L>      09 Sep 2020 07:28              RADIOLOGY & ADDITIONAL TESTS:

## 2020-09-10 NOTE — DISCHARGE NOTE NURSING/CASE MANAGEMENT/SOCIAL WORK - NSDCPEEMAIL_GEN_ALL_CORE
[facility] for Tobacco Control email [email or web address]

## 2020-09-10 NOTE — DISCHARGE NOTE NURSING/CASE MANAGEMENT/SOCIAL WORK - PATIENT PORTAL LINK FT
You can access the FollowMyHealth Patient Portal offered by [facility] by registering at the following website: [email or web address]. By joining [facility]’s FollowMyHealth portal, you will also be able to view your health information using other applications (apps) compatible with our system.

## 2020-09-10 NOTE — DISCHARGE NOTE PROVIDER - NSDCMRMEDTOKEN_GEN_ALL_CORE_FT
budesonide-formoterol 160 mcg-4.5 mcg/inh inhalation aerosol: 2 puff(s) inhaled 2 times a day   predniSONE 10 mg oral tablet: 1 tab(s) orally once a day Taper:  40mg (4tab) x 5 days  30mg (3tab) x 2d  20mg (2tab) x 2d  10mg (1tab) x 2d  Proventil HFA 90 mcg/inh inhalation aerosol: 2 puff(s) inhaled every 4 hours   thiamine 100 mg oral tablet: 1 tab(s) orally once a day

## 2020-09-10 NOTE — PROGRESS NOTE ADULT - SUBJECTIVE AND OBJECTIVE BOX
INTERVAL HPI/OVERNIGHT EVENTS:  Patient seen and examined, No acute events overnight. Pt resting comfortably, reports incisional pain. Denies N/V. Denies flatus or bm   belching     MEDICATIONS  (STANDING):  budesonide 160 MICROgram(s)/formoterol 4.5 MICROgram(s) Inhaler 2 Puff(s) Inhalation two times a day  ciprofloxacin   IVPB 400 milliGRAM(s) IV Intermittent every 12 hours  gabapentin 100 milliGRAM(s) Oral every 12 hours  heparin   Injectable 5000 Unit(s) SubCutaneous every 8 hours  lactated ringers. 1000 milliLiter(s) (50 mL/Hr) IV Continuous <Continuous>  metroNIDAZOLE  IVPB 500 milliGRAM(s) IV Intermittent every 8 hours    MEDICATIONS  (PRN):  acetaminophen   Tablet .. 650 milliGRAM(s) Oral every 6 hours PRN Temp greater or equal to 38C (100.4F)  morphine  - Injectable 2 milliGRAM(s) IV Push every 4 hours PRN Severe Pain (7 - 10)  ondansetron Injectable 4 milliGRAM(s) IV Push every 6 hours PRN Nausea  oxycodone    5 mG/acetaminophen 325 mG 1 Tablet(s) Oral every 4 hours PRN Moderate Pain (4 - 6)      Vital Signs Last 24 Hrs  T(C): 37 (10 Sep 2020 05:21), Max: 37.4 (09 Sep 2020 21:35)  T(F): 98.6 (10 Sep 2020 05:21), Max: 99.3 (09 Sep 2020 21:35)  HR: 69 (10 Sep 2020 05:21) (69 - 93)  BP: 131/58 (10 Sep 2020 05:21) (114/60 - 132/49)  BP(mean): --  RR: 17 (10 Sep 2020 05:21) (17 - 18)  SpO2: 98% (10 Sep 2020 05:21) (95% - 98%)    Physical:  General: Alert and oriented, not in acute distress  Resp: wheezes at bases  (COPD pmhx)  Abdomen: Soft, distended, luis-incisional tenderness; dressings c/d/i, ARIE with sero>sanguinous  Extremities: No pedal edema    I&O's Detail    09 Sep 2020 07:01  -  10 Sep 2020 07:00  --------------------------------------------------------  IN:  Total IN: 0 mL    OUT:    Bulb: 160 mL  Total OUT: 160 mL    Total NET: -160 mL                            14.4   11.81 )-----------( 291      ( 10 Sep 2020 06:01 )             44.9     09-09    140  |  105  |  10  ----------------------------<  137<H>  3.9   |  32<H>  |  0.91    Ca    8.1<L>      09 Sep 2020 07:28    TPro  8.1  /  Alb  3.6  /  TBili  0.8  /  DBili  x   /  AST  17  /  ALT  21  /  AlkPhos  102  09-08

## 2020-09-10 NOTE — DISCHARGE NOTE PROVIDER - NSDCCPCAREPLAN_GEN_ALL_CORE_FT
PRINCIPAL DISCHARGE DIAGNOSIS  Diagnosis: Acute cholecystitis due to biliary calculus  Assessment and Plan of Treatment: Please follow-up with Dr. Aguilar in the office to remove your drain; call to make an appointment. Drink plenty of fluids. Rest as needed. Do not lift anything heavier than 10 pounds. You may take motrin or advil for mild pain as needed. Maximum Tylenol dose should not exceed 4g/day. Empty the drain as needed and record the output.  Call for any fever over 101, nausea, vomiting, severe pain, no passing of gas or bowel movement. You may shower 48 hours postoperatively. Remove outer dressing. Keep white steri-strips in place and allow them to fall off on their own. Pat dry.

## 2020-09-10 NOTE — DISCHARGE NOTE PROVIDER - CARE PROVIDER_API CALL
David Aguilar (MD)  Surgery  [address] [place] [address]  Phone: [phone]  Fax: [phone]  Follow Up Time:

## 2020-09-10 NOTE — DISCHARGE NOTE PROVIDER - HOSPITAL COURSE
74M with PMH of COPD, on symbicort, 2 ppd x60yrs smoking history quit 2 yrs ago, no PSH; presents to ED with 3 days history of abdominal pain associated with emesis x2 this AM. Denies any changes in bowel habits. Pain is on epigastric region to RUQ. Denies prior episodes similar to this or any prior diagnosis of gallbladder disease. Denies fever/chills. Pt underwent an ultrasound which revealed a shadowing gallstone with diffuse thickening of the gallbladder wall suspicious for acute cholecystitis. Pt was started on IV abx and kept NPO.  Pt underwent a laparoscopic cholecystectomy on 9/9/20; pt was noted to have a gangrenous gallbladder. Diet was advanced as tolerated and patient was stable for discharge with the surgical drain in place with appropriate teaching on how to empty it.

## 2020-09-10 NOTE — PROGRESS NOTE ADULT - ASSESSMENT
74M s/p laparoscopic gangrenous cholecystectomy 9/9 at 1am, POD#1 stable  leukocytosis downtrending    - cont ARIE care, monitor output  - cont IV abx  - cont reg diet  - prn pain control  - dvt ppx/oob/ambulation

## 2020-09-10 NOTE — MEDICAL STUDENT PROGRESS NOTE(EDUCATION) - NS MD HP STUD ASPLAN PLAN FT
-pain medication PRN  -dvt ppx + OOB  -continue to monitor drain and I&Os  -advance diet as tolerated  -discharge planning

## 2020-09-15 ENCOUNTER — APPOINTMENT (OUTPATIENT)
Dept: SURGERY | Facility: CLINIC | Age: 75
End: 2020-09-15
Payer: MEDICARE

## 2020-09-15 VITALS
HEIGHT: 70 IN | SYSTOLIC BLOOD PRESSURE: 149 MMHG | HEART RATE: 72 BPM | DIASTOLIC BLOOD PRESSURE: 76 MMHG | TEMPERATURE: 96.6 F

## 2020-09-15 DIAGNOSIS — K80.00 CALCULUS OF GALLBLADDER WITH ACUTE CHOLECYSTITIS W/OUT OBSTRUCTION: ICD-10-CM

## 2020-09-15 LAB — SURGICAL PATHOLOGY STUDY: SIGNIFICANT CHANGE UP

## 2020-09-15 PROCEDURE — 99024 POSTOP FOLLOW-UP VISIT: CPT

## 2020-09-17 ENCOUNTER — APPOINTMENT (OUTPATIENT)
Dept: PULMONOLOGY | Facility: CLINIC | Age: 75
End: 2020-09-17
Payer: MEDICARE

## 2020-09-17 VITALS
TEMPERATURE: 98.2 F | BODY MASS INDEX: 25.54 KG/M2 | SYSTOLIC BLOOD PRESSURE: 122 MMHG | OXYGEN SATURATION: 91 % | WEIGHT: 178 LBS | DIASTOLIC BLOOD PRESSURE: 80 MMHG | RESPIRATION RATE: 16 BRPM | HEART RATE: 79 BPM

## 2020-09-17 DIAGNOSIS — Z23 ENCOUNTER FOR IMMUNIZATION: ICD-10-CM

## 2020-09-17 PROCEDURE — 99214 OFFICE O/P EST MOD 30 MIN: CPT

## 2020-09-17 NOTE — HISTORY OF PRESENT ILLNESS
[Never] : never [TextBox_4] : S/P cholecystectomy last week at [place]. \par \par He denied any cough, wheezing or shortness of breath. \par \par He is on Symbicort. Has not needed to use Ventolin.\par \par He has not been smoking. \par \par  [YearQuit] : 2017

## 2020-09-17 NOTE — DISCUSSION/SUMMARY
[FreeTextEntry1] : He is a 74 year-old man with moderate COPD. He has stopped smoking. S/P cholecystectomy 9//20. \par \par His COPD is stable. He has been doing well on Symbicort (his preference) and albuterol as needed. Weight loss and regular exercise advised. \par \par Decline influenza vaccination. \par \par Follow up in six months.

## 2020-09-17 NOTE — PROCEDURE
[FreeTextEntry1] : PFT 6/11/18: Moderate obstructive pulmonary disease. There is mild restriction. There was a moderate reduction in diffusion. No significant improvement was noted after inhalation of bronchodilator.\par \par PFT 9/12/19: Moderate obstruction. Mild restriction noted. Diffusion was moderately reduced. \par \par CT Chest 9/1/17: A 4 mm nodule in the right upper lobe. Also there were findings consistent with COPD and biapical pleural thickening. There was a left upper lobe granuloma.\par \par CT Chest 10/16/18: Centrilobular emphysema was presented. No nodules were noted. Lingular scarring was present.\par \par CT Chest 9/2/20: Mild mucoid impaction unchanged. No nodules noted. No effusion. No adenopathy.

## 2020-09-23 NOTE — PHYSICAL EXAM
[Normal Heart Sounds] : normal heart sounds [No Rash or Lesion] : No rash or lesion [Normal Rate and Rhythm] : normal rate and rhythm [Alert] : alert [Oriented to Place] : oriented to place [Oriented to Person] : oriented to person [Oriented to Time] : oriented to time [Calm] : calm [de-identified] : The patient is alert, well-groomed, well developed and cheerful.  [de-identified] : Breath sounds equal and bilateral, no wheezing no rales or rhonchi  [de-identified] :  good S1, S2, no m/r/g bilateral  [de-identified] : Normoactive bowel sounds, soft and nontender, no hepatosplenomegaly or masses noted, incision sites healing well, ARIE drain was removed today  [de-identified] : WNL [de-identified] : Patient has left hand skin tear healing and granulating well with a skin flap over it

## 2020-09-23 NOTE — ASSESSMENT
[FreeTextEntry1] : DAYSI PIÑA is a 74 year old male who underwent a Laparoscopic cholecystectomy 09/08/2020 \par \par Patient is doing well, with expected post-operative recovery. All surgical incisions are healing well and as expected. There is no evidence of infection or complication, and patient is progressing as expected. Patient has ARIE drain with 50 ml of serosanguineous drainage in last 24 hr. ARIE drain was removed today. Post-operative wound care, activity, restrictions and precautions reinforced. path pending. Patient was instructed no heavy lifting  4-6 weeks. Patient's questions and concerns addressed to patient's satisfaction. \par  \par - Please follow up with GI for colonoscopy

## 2020-09-23 NOTE — HISTORY OF PRESENT ILLNESS
[de-identified] : DAYSI PIÑA is a 74 year old male who presents in the office for postop and post hospitalization visit. Patient was admitted to [facility] from  09/08/2020 to 09/10/2020 for epigastric region to RUQ pain and was diagnosed with acute cholecystitis. He underwent an ultrasound which revealed a shadowing gallstone with diffuse thickening of the gallbladder wall suspicious for acute cholecystitis. Patient underwent a Laparoscopic cholecystectomy 09/08/2020 pathology results pending. Patient has ARIE drain with 50 ml of serosanguineous drainage in last 24 hr. Today patient is doing well, offers no complaints. Denies any fevers, chills, nausea, vomiting, diarrhea or constipation. Patient able to tolerate regular diet with normal bowel movements. Surgical incisions are healing well. No sign of inflammation or exudate. ARIE drain was removed today. \par

## 2020-12-19 NOTE — H&P ADULT - NSTOBACCOSCREENHP_GEN_A_NCS
Discharge Summary    CHIEF COMPLAINT ON ADMISSION  Chief Complaint   Patient presents with   • Unable to Urinate       Reason for Admission  Chest Pain      Admission Date  12/14/2020    CODE STATUS  Full Code    HPI & HOSPITAL COURSE  This is a 79 y.o. male here with groin pain and urgency.  He is an ESRD patient dependent on dialysis and makes very minimal amount of urine.  He had elevated lactic acid and CT abdomen was performed and very concerning for acute cholecysitis and also showed a RLL infiltrate.  General surgery, Dr Martines, consulted and recommened placement of a percutaneous cholecystotomy drain.  He had improvement in his transaminates and resolution of abdominal pain.  Unfortunately he pulled out the drain and it had to be replaced on day of discharge.  Likely the RLL infiltrate is reactive to the irritation caused by the cholecystitis.  He has been transitioned from IV antibiotics over to augmentin and will complete a 14 day course.  The melinda drain should remain in place for 6 weeks per surgery recommendations.  Patient was continued on his HD throughout his hospitalization.  He also has examination consistent with balanitis and this is suspected as fungal and improving with the initiation of miconazole cream.  Patient will follow up with urology following discharge in addition to following with Dr Martines for eventual removal of the melinda drain.    Therefore, he is discharged in good and stable condition to home with close outpatient follow-up.    The patient met 2-midnight criteria for an inpatient stay at the time of discharge.    Discharge Date  12/19/2020    FOLLOW UP ITEMS POST DISCHARGE  PCP - Dr Hartman  Urology  Surgery - Dr Martines    DISCHARGE DIAGNOSES  Principal Problem:    Acalculous cholecystitis POA: Unknown  Active Problems:    ESRD (end stage renal disease) (HCC) POA: Yes    Pleural effusion POA: Unknown    Chronic systolic congestive heart failure (HCC) POA: Yes    Balanitis  POA: Unknown  Resolved Problems:    Septic shock (HCC) POA: Yes    Lactic acidosis POA: Yes      FOLLOW UP  Future Appointments   Date Time Provider Department Center   12/22/2020  2:15 PM Beverley Jara P.A.-C. [facility] None   1/5/2021  8:30 AM ROLDAN Talavera   4/20/2021  2:00 PM ROLDAN Talavera M.D.  [address]  [address]  [place] [id number] [phone]    Schedule an appointment as soon as possible for a visit in 1 week  For drain check    Maryse Hartman M.D.  [address]  [address]  [facility] [id number] [phone]    In 2 weeks      [facility]  [address]  [facility] [address]  [phone]  In 2 weeks        MEDICATIONS ON DISCHARGE     Medication List      START taking these medications      Instructions   amoxicillin-clavulanate 500-125 MG Tabs  Start taking on: December 20, 2020  Commonly known as: AUGMENTIN   Take 1 Tab by mouth every day.  Dose: 1 Tab     fludrocortisone 0.1 MG Tabs  Start taking on: December 20, 2020  Commonly known as: FLORINEF   Take 2 Tabs by mouth every morning.  Dose: 0.2 mg     miconazole 2 % Crea  Commonly known as: MICOTIN   Apply to penis area twice daily        CHANGE how you take these medications      Instructions   fluticasone 50 MCG/ACT nasal spray  What changed:   · how much to take  · how to take this  · when to take this  Commonly known as: FLONASE   Doctor's comments: Do fill until patient calls please  SPRAY ONE TO TWO SPRAYS IN EACH NOSTRIL ONCE DAILY *FLONASE*     midodrine 10 MG tablet  What changed: additional instructions  Commonly known as: PROAMATINE   Take 1 Tab by mouth 3 times a day, with meals.  Dose: 10 mg     montelukast 10 MG Tabs  What changed: when to take this  Commonly known as: SINGULAIR   Doctor's comments: Do fill until patient calls please  Take 1 Tab by mouth every day.  Dose: 10 mg     torsemide 10 MG tablet  What changed:   · when to take this  · additional  instructions  Commonly known as: DEMADEX   Doctor's comments: Do fill until patient calls please  Take 3 Tabs by mouth every day.  Dose: 30 mg        CONTINUE taking these medications      Instructions   albuterol 108 (90 Base) MCG/ACT Aers inhalation aerosol   Doctor's comments: Do fill until patient calls please  Inhale 2 Puffs by mouth every four hours as needed for Shortness of Breath.  Dose: 2 Puff     calcium acetate 667 MG Caps  Commonly known as: PHOS-LO   Take 1,334 mg by mouth see administration instructions. Pt reports that he takes 2 cap for each snack and 2 cap 3 times a day with meals  Dose: 1,334 mg     DIALYVITE PO   Take 1 Tab by mouth every evening.  Dose: 1 Tab     omeprazole 40 MG delayed-release capsule  Commonly known as: PRILOSEC   Doctor's comments: Do fill until patient calls please  Take 1 Cap by mouth every day.  Dose: 40 mg     ROPINIRole 0.5 MG Tabs  Commonly known as: REQUIP   Doctor's comments: Do fill until patient calls please  Take 2 Tabs by mouth every bedtime.  Dose: 1 mg     rosuvastatin 40 MG tablet  Commonly known as: CRESTOR   Doctor's comments: Do fill until patient calls please  Take 1 Tab by mouth every evening.  Dose: 40 mg     tamsulosin 0.4 MG capsule  Commonly known as: FLOMAX   Doctor's comments: Do fill until patient calls please  Take 2 Caps by mouth every evening.  Dose: 0.8 mg     traZODone 150 MG Tabs  Commonly known as: DESYREL   Take 1 Tab by mouth See Admin Instructions. Pt takes 150MG @ 2130 and another 150MG @ 0130  Dose: 150 mg            Allergies  No Known Allergies    DIET  Orders Placed This Encounter   Procedures   • Diet Order Diet: Renal     Standing Status:   Standing     Number of Occurrences:   1     Order Specific Question:   Diet:     Answer:   Renal [8]       ACTIVITY  As tolerated.  Weight bearing as tolerated    CONSULTATIONS  Ashlee Martines - general surgery  Mary Jo Beatty - nephrology  Osiris Martinez - critical  care    PROCEDURES  12/14/2020 and 12/19/2020 - CT guided placement cholecystotomy tube    LABORATORY  Lab Results   Component Value Date    SODIUM 140 12/19/2020    POTASSIUM 4.8 12/19/2020    CHLORIDE 96 12/19/2020    CO2 23 12/19/2020    GLUCOSE 109 (H) 12/19/2020    BUN 45 (H) 12/19/2020    CREATININE 5.61 (HH) 12/19/2020    CREATININE 2.1 (H) 05/06/2009        Lab Results   Component Value Date    WBC 10.1 12/19/2020    HEMOGLOBIN 9.8 (L) 12/19/2020    HEMATOCRIT 33.4 (L) 12/19/2020    PLATELETCT 261 12/19/2020        Total time of the discharge process exceeds 35 minutes.   Yes

## 2021-03-19 ENCOUNTER — APPOINTMENT (OUTPATIENT)
Dept: PULMONOLOGY | Facility: CLINIC | Age: 76
End: 2021-03-19
Payer: MEDICARE

## 2021-03-19 VITALS
HEIGHT: 70 IN | BODY MASS INDEX: 24.05 KG/M2 | SYSTOLIC BLOOD PRESSURE: 140 MMHG | TEMPERATURE: 97 F | OXYGEN SATURATION: 95 % | DIASTOLIC BLOOD PRESSURE: 80 MMHG | HEART RATE: 88 BPM | WEIGHT: 168 LBS | RESPIRATION RATE: 16 BRPM

## 2021-03-19 PROCEDURE — 99213 OFFICE O/P EST LOW 20 MIN: CPT

## 2021-03-19 PROCEDURE — 99072 ADDL SUPL MATRL&STAF TM PHE: CPT

## 2021-03-19 NOTE — PHYSICAL EXAM
[Normal Appearance] : normal appearance [General Appearance - In No Acute Distress] : no acute distress [Neck Cervical Mass (___cm)] : no neck mass was observed [Heart Sounds] : normal S1 and S2 [Auscultation Breath Sounds / Voice Sounds] : lungs were clear to auscultation bilaterally [] : no hepato-splenomegaly [Abnormal Walk] : normal gait [Nail Clubbing] : no clubbing of the fingernails [Cyanosis, Localized] : no localized cyanosis [Skin Turgor] : normal skin turgor [No Focal Deficits] : no focal deficits [Oriented To Time, Place, And Person] : oriented to person, place, and time [Affect] : the affect was normal

## 2021-03-20 NOTE — DISCUSSION/SUMMARY
[FreeTextEntry1] : He is a 75 year-old man with moderate COPD. Smokes a few cigarettes a few. He is S/P cholecystectomy 9//20. CT Chest 10/16/18: Centrilobular emphysema was presented. No nodules were noted. Lingular scarring was present.CT Chest 9/2/20: Mild mucoid impaction unchanged. No nodules noted. No effusion. No adenopathy. \par \par His COPD is stable. \par \par To stay on Symbicort (his preference) and albuterol as needed. \par \par Weight loss and regular exercise advised. \par \par Symbicort renewed. \par \par Should follow up with PCP. Looking for a new PCP.

## 2021-03-20 NOTE — REVIEW OF SYSTEMS
[Fever] : no fever [Nasal Congestion] : no nasal congestion [Cough] : no cough [Wheezing] : no wheezing [Hypertension] : no ~T hypertension [Chest Discomfort] : no chest discomfort [PND] : no PND [Palpitations] : no palpitations [Edema] : ~T edema was not present [Heartburn] : no heartburn [Back Pain] : ~T no back pain [Myalgias] : no myalgias [Rash] : no [unfilled] rash [Anemia] : no anemia [Depression] : no depression [Diabetes] : no diabetes mellitus [Difficulty Initiating Sleep] : no difficulty falling asleep [Snoring] : no snoring

## 2021-03-20 NOTE — HISTORY OF PRESENT ILLNESS
[Current] : current [TextBox_4] : He denied any cough, wheezing or shortness of breath. \par \par He has not been smoking. \par \par Goes out for walks. \par \par Recieved the COVID 19 vaccine. \par \par  [YearQuit] : 2017

## 2021-06-14 ENCOUNTER — TRANSCRIPTION ENCOUNTER (OUTPATIENT)
Age: 76
End: 2021-06-14

## 2021-08-05 ENCOUNTER — RX RENEWAL (OUTPATIENT)
Age: 76
End: 2021-08-05

## 2021-09-16 ENCOUNTER — APPOINTMENT (OUTPATIENT)
Dept: PULMONOLOGY | Facility: CLINIC | Age: 76
End: 2021-09-16
Payer: MEDICARE

## 2021-09-16 VITALS
DIASTOLIC BLOOD PRESSURE: 70 MMHG | BODY MASS INDEX: 255.26 KG/M2 | SYSTOLIC BLOOD PRESSURE: 146 MMHG | WEIGHT: 315 LBS | OXYGEN SATURATION: 91 % | TEMPERATURE: 96.6 F | HEART RATE: 87 BPM

## 2021-09-16 PROCEDURE — 99213 OFFICE O/P EST LOW 20 MIN: CPT

## 2021-09-16 NOTE — DISCUSSION/SUMMARY
[FreeTextEntry1] : He is a 75 year-old man with moderate COPD. He is S/P cholecystectomy 9//20. CT Chest 10/16/18: Centrilobular emphysema was presented. No nodules were noted. Lingular scarring was present.CT Chest 9/2/20: Mild mucoid impaction unchanged. No nodules noted. No effusion. No adenopathy. \par \par His COPD is stable. To stay on Symbicort (his preference) and albuterol as needed. \par \par Follow up CT requested. \par \par Follow up in 6 months.

## 2021-09-16 NOTE — REVIEW OF SYSTEMS
[Fever] : no fever [Fatigue] : no fatigue [Nasal Congestion] : no nasal congestion [Cough] : no cough [Wheezing] : no wheezing [Hypertension] : no ~T hypertension [Chest Discomfort] : no chest discomfort [Palpitations] : no palpitations [PND] : no PND [Edema] : ~T edema was not present [Heartburn] : no heartburn [Back Pain] : ~T no back pain [Rash] : no [unfilled] rash [Myalgias] : no myalgias [Anemia] : no anemia [Depression] : no depression [Diabetes] : no diabetes mellitus [Difficulty Initiating Sleep] : no difficulty falling asleep [Snoring] : no snoring

## 2021-09-16 NOTE — PHYSICAL EXAM
[General Appearance - In No Acute Distress] : no acute distress [Neck Cervical Mass (___cm)] : no neck mass was observed [Heart Sounds] : normal S1 and S2 [Auscultation Breath Sounds / Voice Sounds] : lungs were clear to auscultation bilaterally [] : no hepato-splenomegaly [Abnormal Walk] : normal gait [Cyanosis, Localized] : no localized cyanosis [Skin Turgor] : normal skin turgor [No Focal Deficits] : no focal deficits [Oriented To Time, Place, And Person] : oriented to person, place, and time [Affect] : the affect was normal

## 2021-09-16 NOTE — HISTORY OF PRESENT ILLNESS
[Current] : current [TextBox_4] : He is feeling well.\par \par Using albuterol less than twice a week. \par \par \par  [YearQuit] : 2017

## 2021-10-14 ENCOUNTER — APPOINTMENT (OUTPATIENT)
Dept: CT IMAGING | Facility: IMAGING CENTER | Age: 76
End: 2021-10-14
Payer: MEDICARE

## 2021-10-14 ENCOUNTER — OUTPATIENT (OUTPATIENT)
Dept: OUTPATIENT SERVICES | Facility: HOSPITAL | Age: 76
LOS: 1 days | End: 2021-10-14
Payer: MEDICARE

## 2021-10-14 DIAGNOSIS — Z90.89 ACQUIRED ABSENCE OF OTHER ORGANS: Chronic | ICD-10-CM

## 2021-10-14 DIAGNOSIS — J44.9 CHRONIC OBSTRUCTIVE PULMONARY DISEASE, UNSPECIFIED: ICD-10-CM

## 2021-10-14 PROCEDURE — 71250 CT THORAX DX C-: CPT | Mod: 26

## 2021-10-14 PROCEDURE — 71250 CT THORAX DX C-: CPT

## 2021-10-15 ENCOUNTER — NON-APPOINTMENT (OUTPATIENT)
Age: 76
End: 2021-10-15

## 2022-02-21 ENCOUNTER — RX RENEWAL (OUTPATIENT)
Age: 77
End: 2022-02-21

## 2022-03-01 ENCOUNTER — TRANSCRIPTION ENCOUNTER (OUTPATIENT)
Age: 77
End: 2022-03-01

## 2022-03-28 ENCOUNTER — RX RENEWAL (OUTPATIENT)
Age: 77
End: 2022-03-28

## 2022-03-31 ENCOUNTER — APPOINTMENT (OUTPATIENT)
Dept: PULMONOLOGY | Facility: CLINIC | Age: 77
End: 2022-03-31
Payer: MEDICARE

## 2022-03-31 VITALS
BODY MASS INDEX: 24.54 KG/M2 | SYSTOLIC BLOOD PRESSURE: 142 MMHG | OXYGEN SATURATION: 95 % | TEMPERATURE: 97.1 F | HEART RATE: 85 BPM | DIASTOLIC BLOOD PRESSURE: 78 MMHG | WEIGHT: 171 LBS

## 2022-03-31 PROCEDURE — 99213 OFFICE O/P EST LOW 20 MIN: CPT

## 2022-03-31 RX ORDER — INDOMETHACIN 50 MG/1
50 CAPSULE ORAL 3 TIMES DAILY
Qty: 30 | Refills: 0 | Status: ACTIVE | COMMUNITY
Start: 2018-06-22 | End: 1900-01-01

## 2022-03-31 NOTE — DISCUSSION/SUMMARY
[FreeTextEntry1] : He is a 76 year-old man with moderate COPD. He has nearly stopped smoking. He is S/P cholecystectomy 9//20. \par \par The COPD is stable. He has been doing well on Symbicort (his preference) and albuterol as needed. LAMA has been tried. \par \par Renew Symbicort and albuterol. \par \par Has an appointment with [employer] to get a PCP. \par \par Follow up in 6 months. Sooner if necessary. \par \par \par \par

## 2022-03-31 NOTE — HISTORY OF PRESENT ILLNESS
[Never] : never [TextBox_4] : The patient came for a scheduled follow up visit today. \par \par He is feeling well except for his gout. He is on Symbicort. Has not needed to use Ventolin.\par \par He smokes two cigarettes per week. \par \par Fully vaccinated for COVID 19 including a booster. \par \par \par  [YearQuit] : 2017 [Difficulty Initiating Sleep] : does not have difficulty initiating sleep [Difficulty Maintaining Sleep] : difficulty maintaining sleep

## 2022-03-31 NOTE — REVIEW OF SYSTEMS
[Dyspnea] : dyspnea [Fever] : no fever [Fatigue] : no fatigue [Nasal Congestion] : no nasal congestion [Cough] : no cough [Hemoptysis] : no hemoptysis [Hypertension] : no ~T hypertension [Chest Discomfort] : no chest discomfort [PND] : no PND [Palpitations] : no palpitations [Edema] : ~T edema was not present [Heartburn] : no heartburn [Back Pain] : ~T no back pain [Myalgias] : no myalgias [Rash] : no [unfilled] rash [Anemia] : no anemia [Depression] : no depression [Diabetes] : no diabetes mellitus

## 2022-03-31 NOTE — PHYSICAL EXAM
[General Appearance - In No Acute Distress] : no acute distress [Jugular Venous Distention Increased] : there was no jugular-venous distention [Heart Sounds] : normal S1 and S2 [Auscultation Breath Sounds / Voice Sounds] : lungs were clear to auscultation bilaterally [Abnormal Walk] : normal gait [] : no hepato-splenomegaly [Nail Clubbing] : no clubbing of the fingernails [Cyanosis, Localized] : no localized cyanosis [Skin Turgor] : normal skin turgor [No Focal Deficits] : no focal deficits [Oriented To Time, Place, And Person] : oriented to person, place, and time [Affect] : the affect was normal

## 2022-09-07 ENCOUNTER — TRANSCRIPTION ENCOUNTER (OUTPATIENT)
Age: 77
End: 2022-09-07

## 2022-09-08 ENCOUNTER — APPOINTMENT (OUTPATIENT)
Dept: PULMONOLOGY | Facility: CLINIC | Age: 77
End: 2022-09-08

## 2022-09-30 ENCOUNTER — TRANSCRIPTION ENCOUNTER (OUTPATIENT)
Age: 77
End: 2022-09-30

## 2022-10-03 ENCOUNTER — TRANSCRIPTION ENCOUNTER (OUTPATIENT)
Age: 77
End: 2022-10-03

## 2022-10-04 ENCOUNTER — TRANSCRIPTION ENCOUNTER (OUTPATIENT)
Age: 77
End: 2022-10-04

## 2022-11-17 ENCOUNTER — APPOINTMENT (OUTPATIENT)
Dept: PULMONOLOGY | Facility: CLINIC | Age: 77
End: 2022-11-17

## 2022-11-17 VITALS
TEMPERATURE: 96.2 F | SYSTOLIC BLOOD PRESSURE: 160 MMHG | BODY MASS INDEX: 22.96 KG/M2 | HEART RATE: 70 BPM | WEIGHT: 160 LBS | DIASTOLIC BLOOD PRESSURE: 72 MMHG | OXYGEN SATURATION: 97 %

## 2022-11-17 PROCEDURE — 99213 OFFICE O/P EST LOW 20 MIN: CPT

## 2022-11-17 RX ORDER — ALBUTEROL SULFATE 90 UG/1
108 (90 BASE) INHALANT RESPIRATORY (INHALATION)
Qty: 1 | Refills: 1 | Status: ACTIVE | COMMUNITY
Start: 2018-11-16 | End: 1900-01-01

## 2022-11-17 NOTE — PHYSICAL EXAM
[General Appearance - In No Acute Distress] : no acute distress [Jugular Venous Distention Increased] : there was no jugular-venous distention [Heart Sounds] : normal S1 and S2 [Auscultation Breath Sounds / Voice Sounds] : lungs were clear to auscultation bilaterally [] : no hepato-splenomegaly [Abnormal Walk] : normal gait [Cyanosis, Localized] : no localized cyanosis [Skin Turgor] : normal skin turgor [No Focal Deficits] : no focal deficits [Oriented To Time, Place, And Person] : oriented to person, place, and time [Affect] : the affect was normal [Heart Rate And Rhythm] : heart rate and rhythm were normal

## 2022-11-17 NOTE — HISTORY OF PRESENT ILLNESS
[Never] : never [Difficulty Maintaining Sleep] : difficulty maintaining sleep [TextBox_4] : The patient came for a scheduled follow up visit today.  No complaint of cough, wheezing or shortness of breath.\par \par He is feeling well except for his gout. He is on Symbicort. Uses albuterol sparingly.  [YearQuit] : 2017 [Difficulty Initiating Sleep] : does not have difficulty initiating sleep

## 2022-11-17 NOTE — PROCEDURE
[FreeTextEntry1] : PFT 6/11/18: Moderate obstructive pulmonary disease. There is mild restriction. There was a moderate reduction in diffusion. No significant improvement was noted after inhalation of bronchodilator.\par \par PFT 9/12/19: Moderate obstruction. Mild restriction noted. Diffusion was moderately reduced. \par \par CT Chest 9/1/17: A 4 mm nodule in the right upper lobe. Also there were findings consistent with COPD and biapical pleural thickening. There was a left upper lobe granuloma.\par \par CT Chest 10/16/18: Centrilobular emphysema was presented. No nodules were noted. Lingular scarring was present.\par \par CT Chest 9/2/20: Mild mucoid impaction unchanged. No nodules noted. No effusion. No adenopathy. \par \par CT Chest 10/15/22: No noncalcified nodules.  Emphysematous changes noted.  No adenopathy.  No effusion.

## 2022-11-17 NOTE — DISCUSSION/SUMMARY
[FreeTextEntry1] : He is a 77 year-old man with moderate COPD. He has nearly stopped smoking. He is S/P cholecystectomy 9//20. \par \par His COPD is stable.  He was advised to continue with Symbicort and albuterol as needed.  He has been on a LAMA in the past but it did not help him.  Follow-up CT discussed.  He prefers to wait 6 months.\par \par \par

## 2023-05-18 ENCOUNTER — APPOINTMENT (OUTPATIENT)
Dept: PULMONOLOGY | Facility: CLINIC | Age: 78
End: 2023-05-18
Payer: MEDICARE

## 2023-05-18 VITALS
TEMPERATURE: 98 F | SYSTOLIC BLOOD PRESSURE: 164 MMHG | OXYGEN SATURATION: 96 % | BODY MASS INDEX: 25.11 KG/M2 | DIASTOLIC BLOOD PRESSURE: 72 MMHG | WEIGHT: 175 LBS | HEART RATE: 89 BPM

## 2023-05-18 PROCEDURE — 99214 OFFICE O/P EST MOD 30 MIN: CPT | Mod: 25

## 2023-05-18 PROCEDURE — 94060 EVALUATION OF WHEEZING: CPT

## 2023-05-18 NOTE — HISTORY OF PRESENT ILLNESS
[Difficulty Maintaining Sleep] : difficulty maintaining sleep [Current] : current [TextBox_4] : The patient came for a scheduled follow up visit today.\par \par He stopped smoking three months. Remains on Symbicort and albuterol. \par \par Uses Ventolin on occasion, once a month. \par \par He has a new PCP. Was started on a medication for BP. [facility] in [place]. \par \par  [Awakes Unrefreshed] : does not awaken unrefreshed [Difficulty Initiating Sleep] : does not have difficulty initiating sleep

## 2023-05-18 NOTE — PROCEDURE
[FreeTextEntry1] : PFT 6/11/18: Moderate obstructive pulmonary disease. There is mild restriction. There was a moderate reduction in diffusion. No significant improvement was noted after inhalation of bronchodilator.\par \par PFT 9/12/19: Moderate obstruction. Mild restriction noted. Diffusion was moderately reduced. \par \par PFT 5/18/23: Severe obstruction. Mild improvement after bronchodilator. (Pt did not want to do a full PFT)\par \par CT Chest 9/1/17: A 4 mm nodule in the right upper lobe. Also there were findings consistent with COPD and biapical pleural thickening. There was a left upper lobe granuloma.\par \par CT Chest 10/16/18: Centrilobular emphysema was presented. No nodules were noted. Lingular scarring was present.\par \par CT Chest 9/2/20: Mild mucoid impaction unchanged. No nodules noted. No effusion. No adenopathy. \par \par CT Chest 10/14/21: No noncalcified nodules.  Emphysematous changes noted.  No adenopathy.  No effusion.

## 2023-05-18 NOTE — PHYSICAL EXAM
[General Appearance - In No Acute Distress] : no acute distress [Jugular Venous Distention Increased] : there was no jugular-venous distention [Heart Rate And Rhythm] : heart rate and rhythm were normal [Heart Sounds] : normal S1 and S2 [Auscultation Breath Sounds / Voice Sounds] : lungs were clear to auscultation bilaterally [] : no hepato-splenomegaly [Abnormal Walk] : normal gait [Cyanosis, Localized] : no localized cyanosis [Skin Turgor] : normal skin turgor [No Focal Deficits] : no focal deficits [Oriented To Time, Place, And Person] : oriented to person, place, and time [Affect] : the affect was normal [Normal Appearance] : normal appearance [Well Groomed] : well groomed [Neck Appearance] : the appearance of the neck was normal

## 2023-05-18 NOTE — REVIEW OF SYSTEMS
[Dyspnea] : dyspnea [Fever] : no fever [Fatigue] : no fatigue [Nasal Congestion] : no nasal congestion [Cough] : cough [Sputum] : sputum  [Hemoptysis] : no hemoptysis [Chest Tightness] : no chest tightness [Wheezing] : no wheezing [Hypertension] : no ~T hypertension [Chest Discomfort] : no chest discomfort [PND] : no PND [Palpitations] : no palpitations [Edema] : ~T edema was not present [Heartburn] : no heartburn [Back Pain] : ~T no back pain [Myalgias] : no myalgias [Rash] : no [unfilled] rash [Anemia] : no anemia [Depression] : no depression [Diabetes] : no diabetes mellitus

## 2023-05-18 NOTE — DISCUSSION/SUMMARY
[FreeTextEntry1] : He is a 77 year-old man with moderate COPD. He has nearly stopped smoking. He is S/P cholecystectomy 9//20. \par \par Impression: \par Severe COPD\par - symptoms are stable\par Ex-smoker\par - stopped in early 2023\par \par Plan: \par Given a trial of Breztri\par - to call me if he prefers it over Symbicort\par CT advised\par - he prefers to wait\par Follow up in 6 months\par - sooner if necessary

## 2023-06-19 ENCOUNTER — RX RENEWAL (OUTPATIENT)
Age: 78
End: 2023-06-19

## 2023-06-19 RX ORDER — BUDESONIDE AND FORMOTEROL FUMARATE DIHYDRATE 160; 4.5 UG/1; UG/1
160-4.5 AEROSOL RESPIRATORY (INHALATION) TWICE DAILY
Qty: 1 | Refills: 5 | Status: ACTIVE | COMMUNITY
Start: 2018-04-06 | End: 1900-01-01

## 2023-11-16 ENCOUNTER — APPOINTMENT (OUTPATIENT)
Dept: PULMONOLOGY | Facility: CLINIC | Age: 78
End: 2023-11-16
Payer: MEDICARE

## 2023-11-16 VITALS
DIASTOLIC BLOOD PRESSURE: 60 MMHG | OXYGEN SATURATION: 95 % | WEIGHT: 178 LBS | SYSTOLIC BLOOD PRESSURE: 152 MMHG | HEART RATE: 86 BPM | TEMPERATURE: 97.1 F | BODY MASS INDEX: 25.54 KG/M2

## 2023-11-16 PROCEDURE — 99213 OFFICE O/P EST LOW 20 MIN: CPT

## 2024-04-03 ENCOUNTER — RX RENEWAL (OUTPATIENT)
Age: 79
End: 2024-04-03

## 2024-04-03 RX ORDER — BUDESONIDE AND FORMOTEROL FUMARATE DIHYDRATE 160; 4.5 UG/1; UG/1
160-4.5 AEROSOL RESPIRATORY (INHALATION) TWICE DAILY
Qty: 1 | Refills: 2 | Status: ACTIVE | COMMUNITY
Start: 2024-04-03 | End: 1900-01-01

## 2024-05-23 ENCOUNTER — APPOINTMENT (OUTPATIENT)
Dept: PULMONOLOGY | Facility: CLINIC | Age: 79
End: 2024-05-23
Payer: MEDICARE

## 2024-05-23 VITALS
OXYGEN SATURATION: 94 % | BODY MASS INDEX: 25.25 KG/M2 | HEART RATE: 92 BPM | DIASTOLIC BLOOD PRESSURE: 70 MMHG | TEMPERATURE: 97.5 F | WEIGHT: 176 LBS | SYSTOLIC BLOOD PRESSURE: 178 MMHG

## 2024-05-23 DIAGNOSIS — R93.89 ABNORMAL FINDINGS ON DIAGNOSTIC IMAGING OF OTHER SPECIFIED BODY STRUCTURES: ICD-10-CM

## 2024-05-23 DIAGNOSIS — Z87.891 PERSONAL HISTORY OF NICOTINE DEPENDENCE: ICD-10-CM

## 2024-05-23 DIAGNOSIS — J44.9 CHRONIC OBSTRUCTIVE PULMONARY DISEASE, UNSPECIFIED: ICD-10-CM

## 2024-05-23 DIAGNOSIS — R91.8 OTHER NONSPECIFIC ABNORMAL FINDING OF LUNG FIELD: ICD-10-CM

## 2024-05-23 PROCEDURE — 99214 OFFICE O/P EST MOD 30 MIN: CPT

## 2024-05-23 NOTE — PROCEDURE
[FreeTextEntry1] : PFT 6/11/18: Moderate obstructive pulmonary disease. There is mild restriction. There was a moderate reduction in diffusion. No significant improvement was noted after inhalation of bronchodilator.  PFT 9/12/19: Moderate obstruction. Mild restriction noted. Diffusion was moderately reduced.   PFT 5/18/23: Severe obstruction. Mild improvement after bronchodilator. (Pt did not want to do a full PFT)  CT Chest 9/1/17: A 4 mm nodule in the right upper lobe. Also there were findings consistent with COPD and biapical pleural thickening. There was a left upper lobe granuloma.  CT Chest 10/16/18: Centrilobular emphysema was presented. No nodules were noted. Lingular scarring was present.  CT Chest 9/2/20: Mild mucoid impaction unchanged. No nodules noted. No effusion. No adenopathy.   CT Chest 10/14/21: No noncalcified nodules.  Emphysematous changes noted.  No adenopathy.  No effusion.

## 2024-05-23 NOTE — PHYSICAL EXAM
[Normal Appearance] : normal appearance [Well Groomed] : well groomed [General Appearance - In No Acute Distress] : no acute distress [Heart Rate And Rhythm] : heart rate and rhythm were normal [Heart Sounds] : normal S1 and S2 [Auscultation Breath Sounds / Voice Sounds] : lungs were clear to auscultation bilaterally [] : no hepato-splenomegaly [Abnormal Walk] : normal gait [Cyanosis, Localized] : no localized cyanosis [Skin Turgor] : normal skin turgor [No Focal Deficits] : no focal deficits [Oriented To Time, Place, And Person] : oriented to person, place, and time [Affect] : the affect was normal [Neck Appearance] : the appearance of the neck was normal [Exaggerated Use Of Accessory Muscles For Inspiration] : no accessory muscle use [Abdomen Tenderness] : non-tender [Nail Clubbing] : no clubbing of the fingernails

## 2024-05-23 NOTE — DISCUSSION/SUMMARY
[FreeTextEntry1] : He is a 78 year-old man with COPD.  He stopped smoking in February 2023  Impression:  Severe COPD -Clinically stable -On Symbicort, he prefers it to Breztri Ex-smoker  Recommend Continue with Symbicort Repeat CT of the chest advised  Cardiology evaluation Follow-up in 6 months, sooner if needed

## 2024-05-23 NOTE — REVIEW OF SYSTEMS
[Dyspnea] : dyspnea [Fever] : no fever [Poor Appetite] : normal appetite  [Cough] : no cough [Sputum] : not coughing up ~M sputum [Hemoptysis] : no hemoptysis [Chest Tightness] : no chest tightness [Wheezing] : no wheezing [Hypertension] : no ~T hypertension [Chest Discomfort] : no chest discomfort [Palpitations] : no palpitations [Edema] : ~T edema was not present [Heartburn] : no heartburn [Back Pain] : ~T no back pain [Myalgias] : no myalgias [Rash] : no [unfilled] rash [Anemia] : no anemia [Depression] : no depression [Diabetes] : no diabetes mellitus

## 2024-05-23 NOTE — HISTORY OF PRESENT ILLNESS
[Former] : former [Difficulty Maintaining Sleep] : difficulty maintaining sleep [TextBox_4] : He stopped smoking in February 2023.  He has gained about 12 to 13 pounds.  He is using Symbicort and is doing well with it.  He does not use albuterol regularly.   Complains of MCDOWELL.  He attributes it to the weight gain.  Denied any exertional chest pain or pressure. -    [YearQuit] : 2013 [Awakes Unrefreshed] : does not awaken unrefreshed [Difficulty Initiating Sleep] : does not have difficulty initiating sleep

## 2024-06-20 ENCOUNTER — OUTPATIENT (OUTPATIENT)
Dept: OUTPATIENT SERVICES | Facility: HOSPITAL | Age: 79
LOS: 1 days | End: 2024-06-20
Payer: MEDICARE

## 2024-06-20 ENCOUNTER — APPOINTMENT (OUTPATIENT)
Dept: CT IMAGING | Facility: IMAGING CENTER | Age: 79
End: 2024-06-20
Payer: MEDICARE

## 2024-06-20 DIAGNOSIS — Z87.891 PERSONAL HISTORY OF NICOTINE DEPENDENCE: ICD-10-CM

## 2024-06-20 DIAGNOSIS — R91.8 OTHER NONSPECIFIC ABNORMAL FINDING OF LUNG FIELD: ICD-10-CM

## 2024-06-20 DIAGNOSIS — J44.9 CHRONIC OBSTRUCTIVE PULMONARY DISEASE, UNSPECIFIED: ICD-10-CM

## 2024-06-20 DIAGNOSIS — Z90.89 ACQUIRED ABSENCE OF OTHER ORGANS: Chronic | ICD-10-CM

## 2024-06-20 PROCEDURE — 71250 CT THORAX DX C-: CPT | Mod: 26

## 2024-06-20 PROCEDURE — 71250 CT THORAX DX C-: CPT

## 2024-07-05 ENCOUNTER — NON-APPOINTMENT (OUTPATIENT)
Age: 79
End: 2024-07-05

## 2024-07-17 ENCOUNTER — RX RENEWAL (OUTPATIENT)
Age: 79
End: 2024-07-17

## 2024-07-25 ENCOUNTER — NON-APPOINTMENT (OUTPATIENT)
Age: 79
End: 2024-07-25

## 2024-11-21 ENCOUNTER — NON-APPOINTMENT (OUTPATIENT)
Age: 79
End: 2024-11-21

## 2024-11-21 ENCOUNTER — APPOINTMENT (OUTPATIENT)
Dept: PULMONOLOGY | Facility: CLINIC | Age: 79
End: 2024-11-21
Payer: MEDICARE

## 2024-11-21 VITALS
WEIGHT: 177 LBS | TEMPERATURE: 97.6 F | HEART RATE: 83 BPM | OXYGEN SATURATION: 92 % | SYSTOLIC BLOOD PRESSURE: 170 MMHG | HEIGHT: 70 IN | DIASTOLIC BLOOD PRESSURE: 80 MMHG | BODY MASS INDEX: 25.34 KG/M2

## 2024-11-21 DIAGNOSIS — R93.89 ABNORMAL FINDINGS ON DIAGNOSTIC IMAGING OF OTHER SPECIFIED BODY STRUCTURES: ICD-10-CM

## 2024-11-21 DIAGNOSIS — J44.9 CHRONIC OBSTRUCTIVE PULMONARY DISEASE, UNSPECIFIED: ICD-10-CM

## 2024-11-21 PROCEDURE — 94060 EVALUATION OF WHEEZING: CPT

## 2024-11-21 PROCEDURE — 99214 OFFICE O/P EST MOD 30 MIN: CPT | Mod: 25

## 2024-12-02 ENCOUNTER — TRANSCRIPTION ENCOUNTER (OUTPATIENT)
Age: 79
End: 2024-12-02

## 2025-05-28 ENCOUNTER — NON-APPOINTMENT (OUTPATIENT)
Age: 80
End: 2025-05-28

## 2025-05-29 ENCOUNTER — APPOINTMENT (OUTPATIENT)
Dept: PULMONOLOGY | Facility: CLINIC | Age: 80
End: 2025-05-29
Payer: MEDICARE

## 2025-05-29 VITALS
SYSTOLIC BLOOD PRESSURE: 176 MMHG | HEIGHT: 70 IN | OXYGEN SATURATION: 98 % | HEART RATE: 86 BPM | DIASTOLIC BLOOD PRESSURE: 84 MMHG | BODY MASS INDEX: 25.62 KG/M2 | TEMPERATURE: 98 F | WEIGHT: 179 LBS

## 2025-05-29 VITALS — SYSTOLIC BLOOD PRESSURE: 162 MMHG | DIASTOLIC BLOOD PRESSURE: 78 MMHG

## 2025-05-29 DIAGNOSIS — R06.02 SHORTNESS OF BREATH: ICD-10-CM

## 2025-05-29 DIAGNOSIS — R93.89 ABNORMAL FINDINGS ON DIAGNOSTIC IMAGING OF OTHER SPECIFIED BODY STRUCTURES: ICD-10-CM

## 2025-05-29 DIAGNOSIS — Z87.891 PERSONAL HISTORY OF NICOTINE DEPENDENCE: ICD-10-CM

## 2025-05-29 DIAGNOSIS — J44.9 CHRONIC OBSTRUCTIVE PULMONARY DISEASE, UNSPECIFIED: ICD-10-CM

## 2025-05-29 DIAGNOSIS — R91.8 OTHER NONSPECIFIC ABNORMAL FINDING OF LUNG FIELD: ICD-10-CM

## 2025-05-29 PROCEDURE — G2211 COMPLEX E/M VISIT ADD ON: CPT

## 2025-05-29 PROCEDURE — 99214 OFFICE O/P EST MOD 30 MIN: CPT
